# Patient Record
Sex: MALE | Race: BLACK OR AFRICAN AMERICAN | Employment: FULL TIME | URBAN - METROPOLITAN AREA
[De-identification: names, ages, dates, MRNs, and addresses within clinical notes are randomized per-mention and may not be internally consistent; named-entity substitution may affect disease eponyms.]

---

## 2018-11-28 ENCOUNTER — HOSPITAL ENCOUNTER (EMERGENCY)
Age: 33
Discharge: STILL ADMITTED OR ADMINISTRATIVE | End: 2018-11-28
Attending: EMERGENCY MEDICINE

## 2018-11-28 PROCEDURE — 75810000275 HC EMERGENCY DEPT VISIT NO LEVEL OF CARE

## 2021-07-27 ENCOUNTER — HOSPITAL ENCOUNTER (EMERGENCY)
Age: 36
Discharge: HOME | End: 2021-07-27
Attending: EMERGENCY MEDICINE

## 2021-07-27 ENCOUNTER — APPOINTMENT (OUTPATIENT)
Dept: CT IMAGING | Age: 36
End: 2021-07-27
Attending: PHYSICIAN ASSISTANT

## 2021-07-27 VITALS
HEART RATE: 79 BPM | BODY MASS INDEX: 37.19 KG/M2 | DIASTOLIC BLOOD PRESSURE: 109 MMHG | HEIGHT: 77 IN | OXYGEN SATURATION: 96 % | SYSTOLIC BLOOD PRESSURE: 154 MMHG | RESPIRATION RATE: 16 BRPM | WEIGHT: 315 LBS | TEMPERATURE: 98.7 F

## 2021-07-27 DIAGNOSIS — M54.50 CHRONIC RIGHT-SIDED LOW BACK PAIN WITHOUT SCIATICA: Primary | ICD-10-CM

## 2021-07-27 DIAGNOSIS — G89.29 CHRONIC RIGHT-SIDED LOW BACK PAIN WITHOUT SCIATICA: Primary | ICD-10-CM

## 2021-07-27 LAB
ALBUMIN SERPL-MCNC: 3.8 G/DL (ref 3.4–5)
ALBUMIN/GLOB SERPL: 1 {RATIO} (ref 0.8–1.7)
ALP SERPL-CCNC: 75 U/L (ref 45–117)
ALT SERPL-CCNC: 43 U/L (ref 16–61)
ANION GAP SERPL CALC-SCNC: 6 MMOL/L (ref 3–18)
APPEARANCE UR: CLEAR
AST SERPL-CCNC: 20 U/L (ref 10–38)
BACTERIA URNS QL MICRO: ABNORMAL /HPF
BASOPHILS # BLD: 0 K/UL (ref 0–0.1)
BASOPHILS NFR BLD: 1 % (ref 0–2)
BILIRUB SERPL-MCNC: 0.6 MG/DL (ref 0.2–1)
BILIRUB UR QL: NEGATIVE
BUN SERPL-MCNC: 17 MG/DL (ref 7–18)
BUN/CREAT SERPL: 20 (ref 12–20)
CALCIUM SERPL-MCNC: 9.2 MG/DL (ref 8.5–10.1)
CHLORIDE SERPL-SCNC: 106 MMOL/L (ref 100–111)
CO2 SERPL-SCNC: 27 MMOL/L (ref 21–32)
COLOR UR: YELLOW
CREAT SERPL-MCNC: 0.85 MG/DL (ref 0.6–1.3)
DIFFERENTIAL METHOD BLD: ABNORMAL
EOSINOPHIL # BLD: 0.1 K/UL (ref 0–0.4)
EOSINOPHIL NFR BLD: 2 % (ref 0–5)
EPITH CASTS URNS QL MICRO: ABNORMAL /LPF (ref 0–5)
ERYTHROCYTE [DISTWIDTH] IN BLOOD BY AUTOMATED COUNT: 13.9 % (ref 11.6–14.5)
GLOBULIN SER CALC-MCNC: 3.8 G/DL (ref 2–4)
GLUCOSE SERPL-MCNC: 108 MG/DL (ref 74–99)
GLUCOSE UR STRIP.AUTO-MCNC: NEGATIVE MG/DL
HCT VFR BLD AUTO: 50.5 % (ref 36–48)
HGB BLD-MCNC: 16.4 G/DL (ref 13–16)
HGB UR QL STRIP: ABNORMAL
KETONES UR QL STRIP.AUTO: NEGATIVE MG/DL
LEUKOCYTE ESTERASE UR QL STRIP.AUTO: NEGATIVE
LYMPHOCYTES # BLD: 1.9 K/UL (ref 0.9–3.6)
LYMPHOCYTES NFR BLD: 35 % (ref 21–52)
MCH RBC QN AUTO: 28.1 PG (ref 24–34)
MCHC RBC AUTO-ENTMCNC: 32.5 G/DL (ref 31–37)
MCV RBC AUTO: 86.6 FL (ref 74–97)
MONOCYTES # BLD: 0.4 K/UL (ref 0.05–1.2)
MONOCYTES NFR BLD: 7 % (ref 3–10)
NEUTS SEG # BLD: 3 K/UL (ref 1.8–8)
NEUTS SEG NFR BLD: 55 % (ref 40–73)
NITRITE UR QL STRIP.AUTO: NEGATIVE
PH UR STRIP: 5 [PH] (ref 5–8)
PLATELET # BLD AUTO: 156 K/UL (ref 135–420)
PMV BLD AUTO: 11.5 FL (ref 9.2–11.8)
POTASSIUM SERPL-SCNC: 4.4 MMOL/L (ref 3.5–5.5)
PROT SERPL-MCNC: 7.6 G/DL (ref 6.4–8.2)
PROT UR STRIP-MCNC: NEGATIVE MG/DL
RBC # BLD AUTO: 5.83 M/UL (ref 4.35–5.65)
RBC #/AREA URNS HPF: ABNORMAL /HPF (ref 0–5)
SODIUM SERPL-SCNC: 139 MMOL/L (ref 136–145)
SP GR UR REFRACTOMETRY: 1.02 (ref 1–1.03)
UROBILINOGEN UR QL STRIP.AUTO: 0.2 EU/DL (ref 0.2–1)
WBC # BLD AUTO: 5.4 K/UL (ref 4.6–13.2)
WBC URNS QL MICRO: ABNORMAL /HPF (ref 0–4)

## 2021-07-27 PROCEDURE — 74176 CT ABD & PELVIS W/O CONTRAST: CPT

## 2021-07-27 PROCEDURE — 81001 URINALYSIS AUTO W/SCOPE: CPT

## 2021-07-27 PROCEDURE — 99282 EMERGENCY DEPT VISIT SF MDM: CPT

## 2021-07-27 PROCEDURE — 80053 COMPREHEN METABOLIC PANEL: CPT

## 2021-07-27 PROCEDURE — 85025 COMPLETE CBC W/AUTO DIFF WBC: CPT

## 2021-07-27 RX ORDER — KETOROLAC TROMETHAMINE 10 MG/1
10 TABLET, FILM COATED ORAL
Qty: 10 TABLET | Refills: 0 | Status: SHIPPED | OUTPATIENT
Start: 2021-07-27

## 2021-07-27 NOTE — ED TRIAGE NOTES
Patient comes in complaining of right lower back pain. Patient states that he has been dealing with this for the last minutes. Patient states \"I hope this is not like the kidney failure that I had before. \" Patient states that it feels the same. Patient states that he tried to schedule an appt with his doctor but she no longer works in [place]. Registered Nurse

## 2021-07-27 NOTE — ED PROVIDER NOTES
[facility] EMERGENCY DEPT    Date: 7/27/2021  Patient Name: Ernestine Alfonso IV    History of Presenting Illness     Chief Complaint   Patient presents with    Flank Pain     39 y.o. male presents the ED complaining of right lower back pain for the past several months. Patient notes having a mild aching to his right SI joint region, which is worse when getting up in the morning. He has a history of kidney failure while in college, is worried that it could be that today. Patient states that he called his doctor to set up an appointment, was told that it would be quite some time, so he was directed to come here. He denies any urinary changes, numbness or weakness in his legs, bowel or bladder function loss, abdominal pain, saddle paresthesias, radiation of pain, other symptoms. Patient denies any other associated signs or symptoms. Patient denies any other complaints. Nursing notes regarding the HPI and triage nursing notes were reviewed. Prior medical records were reviewed. Past History     Past Medical History:  None     Past Surgical History:  No past surgical history on file. Family History:  No family history on file. Social History:  Social History     Tobacco Use    Smoking status: Not on file   Substance Use Topics    Alcohol use: Not on file    Drug use: Not on file       Allergies:  No Known Allergies    Patient's primary care provider (as noted in EPIC):  None    Review of Systems   Constitutional:  Denies malaise, fever, chills. Cardiac:  Denies chest pain or palpitations. Respiratory:  Denies cough, wheezing, difficulty breathing, shortness of breath. GI/ABD:  Denies injury, pain, distention, nausea, vomiting, diarrhea. :  Denies injury, pain, dysuria or urgency. Back: + right lower back pain. Pelvis:  Denies injury or pain. Extremity/MS:  Denies injury or pain.    Neuro:  Denies headache, LOC, dizziness, neurologic symptoms/deficits/paresthesias. Skin: Denies injury, rash, itching or skin changes. All other systems negative as reviewed. Visit Vitals  BP (!) 154/109   Pulse 79   Temp 98.7 °F (37.1 °C)   Resp 16   Ht 6' 4.5\" (1.943 m)   Wt 147.4 kg (325 lb)   SpO2 96%   BMI 39.04 kg/m²       PHYSICAL EXAM:  CONSTITUTIONAL:  Alert, in no apparent distress;  well developed;  well nourished. HEAD:  Normocephalic, atraumatic. EYES:  EOMI. Non-icteric sclera. Normal conjunctiva. ENTM:  Nose:  no rhinorrhea. Throat:  no erythema or exudate, mucous membranes moist.  NECK:  Supple  RESPIRATORY:  Chest clear, equal breath sounds, good air movement. CARDIOVASCULAR:  Regular rate and rhythm. No murmurs, rubs, or gallops. GI:  Normal bowel sounds, abdomen soft and non-tender. No rebound or guarding. BACK:  Lower right paralumbar/SI joint region with reproducible tenderness to palpation. No midline vertebral bony point tenderness or step-off. Normal adriano-anal sensation. Normal bilateral straight leg raise. UPPER EXT:  Normal inspection. LOWER EXT:  No edema, no calf tenderness. Distal pulses intact. NEURO:  Moves all four extremities, and grossly normal motor exam.  SKIN:  No rashes;  Normal for age. PSYCH:  Alert and normal affect. DIFFERENTIAL DIAGNOSES/ MEDICAL DECISION MAKING:  Low back pain from myofascial strain/ sprain, muscle spasm, vertebral disc problem, neuropathy to include sciatica, abscess/infection, referred retroperitoneal pain from pyelonephritis or ureterolithiasis, other etiologies versus a combination of the above (example: myofascial strain/ sprain as well as muscle spasm).     Recent Results (from the past 12 hour(s))   URINALYSIS W/ RFLX MICROSCOPIC    Collection Time: 07/27/21 11:20 AM   Result Value Ref Range    Color YELLOW      Appearance CLEAR      Specific gravity 1.025 1.005 - 1.030      pH (UA) 5.0 5.0 - 8.0      Protein Negative NEG mg/dL    Glucose Negative NEG mg/dL    Ketone Negative NEG mg/dL    Bilirubin Negative NEG      Blood TRACE (A) NEG      Urobilinogen 0.2 0.2 - 1.0 EU/dL    Nitrites Negative NEG      Leukocyte Esterase Negative NEG     METABOLIC PANEL, COMPREHENSIVE    Collection Time: 07/27/21 11:20 AM   Result Value Ref Range    Sodium 139 136 - 145 mmol/L    Potassium 4.4 3.5 - 5.5 mmol/L    Chloride 106 100 - 111 mmol/L    CO2 27 21 - 32 mmol/L    Anion gap 6 3.0 - 18 mmol/L    Glucose 108 (H) 74 - 99 mg/dL    BUN 17 7.0 - 18 MG/DL    Creatinine 0.85 0.6 - 1.3 MG/DL    BUN/Creatinine ratio 20 12 - 20      GFR est AA >60 >60 ml/min/1.73m2    GFR est non-AA >60 >60 ml/min/1.73m2    Calcium 9.2 8.5 - 10.1 MG/DL    Bilirubin, total 0.6 0.2 - 1.0 MG/DL    ALT (SGPT) 43 16 - 61 U/L    AST (SGOT) 20 10 - 38 U/L    Alk. phosphatase 75 45 - 117 U/L    Protein, total 7.6 6.4 - 8.2 g/dL    Albumin 3.8 3.4 - 5.0 g/dL    Globulin 3.8 2.0 - 4.0 g/dL    A-G Ratio 1.0 0.8 - 1.7     CBC WITH AUTOMATED DIFF    Collection Time: 07/27/21 11:20 AM   Result Value Ref Range    WBC 5.4 4.6 - 13.2 K/uL    RBC 5.83 (H) 4.35 - 5.65 M/uL    HGB 16.4 (H) 13.0 - 16.0 g/dL    HCT 50.5 (H) 36.0 - 48.0 %    MCV 86.6 74.0 - 97.0 FL    MCH 28.1 24.0 - 34.0 PG    MCHC 32.5 31.0 - 37.0 g/dL    RDW 13.9 11.6 - 14.5 %    PLATELET 229 629 - 105 K/uL    MPV 11.5 9.2 - 11.8 FL    NEUTROPHILS 55 40 - 73 %    LYMPHOCYTES 35 21 - 52 %    MONOCYTES 7 3 - 10 %    EOSINOPHILS 2 0 - 5 %    BASOPHILS 1 0 - 2 %    ABS. NEUTROPHILS 3.0 1.8 - 8.0 K/UL    ABS. LYMPHOCYTES 1.9 0.9 - 3.6 K/UL    ABS. MONOCYTES 0.4 0.05 - 1.2 K/UL    ABS. EOSINOPHILS 0.1 0.0 - 0.4 K/UL    ABS.  BASOPHILS 0.0 0.0 - 0.1 K/UL    DF AUTOMATED     URINE MICROSCOPIC ONLY    Collection Time: 07/27/21 11:20 AM   Result Value Ref Range    WBC 1 to 2 0 - 4 /hpf    RBC 0 to 1 0 - 5 /hpf    Epithelial cells FEW 0 - 5 /lpf    Bacteria FEW (A) NEG /hpf      CT ABD PELV WO CONT    Result Date: 7/27/2021  CT ABDOMEN AND PELVIS WITHOUT ENHANCEMENT INDICATION: Right flank pain, history of kidney failure. TECHNIQUE: Axial images obtained of the abdomen and pelvis without intravenous contrast. Coronal and sagittal reformatted images of the abdomen and pelvis were obtained. All CT scans at this facility are performed using dose optimization technique as appropriate to a performed exam, to include automated exposure control, adjustment of the mA and/or kV according to patient size (including appropriate matching first site-specific examinations), or use of iterative reconstruction technique. COMPARISON: None. Lack of intravenous contrast renders this study suboptimal for evaluating the solid abdominal organs, vasculature and bowel. ABDOMEN FINDINGS: Liver: Patchy mild hepatic steatosis. Spleen: Unremarkable. Pancreas: Unremarkable. Biliary: Unremarkable. Bowel: Unremarkable. Normal appendix. Peritoneum/ Retroperitoneum: 1.1 cm nodule in the left anterior abdomen is favored to represent a splenule. Lymph Nodes: Unremarkable. Adrenal Glands: Unremarkable. Kidneys: There are a few cystic appearing lesions in the left upper pole kidney measuring up to 2.2 cm laterally. Subcentimeter hypodensity in the cortex of the right lower pole, too small to evaluate but statistically most likely benign. No hydronephrosis or nephrolithiasis. Vessels: Unremarkable for age. PELVIS FINDINGS: Bladder/ Pelvic Organs: Unremarkable. Lung Base: Unremarkable. Bones/Soft tissues: Mild gynecomastia. Disc bulge at L5-S1 with Schmorl's node. Degenerative changes L5-S1 without other acute findings to explain flank pain. Patchy hepatic steatosis. IMPRESSION AND MEDICAL DECISION MAKING:  There is no signs of sciatica. No perianal decreased sensation nor bowel/bladder incontinence to suggest a neurological emergency. The patient does not have fever, no other signs of infection to suggest an epidural or other back abscess that would require emergent intervention. No stone or infection. Likely MS. Rx for toradol, pt to f/u with ortho. Diagnosis:   1. Chronic right-sided low back pain without sciatica      Disposition: Discharge    Follow-up Information     Follow up With Specialties Details Why [address]  In 3 days  [address]    [facility] EMERGENCY DEPT Emergency Medicine  If symptoms worsen [address] [facility] [address]  [phone]          Patient's Medications   Start Taking    KETOROLAC (TORADOL) 10 MG TABLET    Take 1 Tablet by mouth every six (6) hours as needed for Pain for up to 10 doses.    Continue Taking    No medications on file   These Medications have changed    No medications on file   Stop Taking    No medications on file     DOTTIE Burroughs

## 2023-06-02 PROBLEM — M51.26 HNP (HERNIATED NUCLEUS PULPOSUS), LUMBAR: Status: ACTIVE | Noted: 2023-06-02

## 2023-06-29 ENCOUNTER — APPOINTMENT (OUTPATIENT)
Facility: HOSPITAL | Age: 38
End: 2023-06-29
Payer: COMMERCIAL

## 2023-06-29 ENCOUNTER — HOSPITAL ENCOUNTER (EMERGENCY)
Facility: HOSPITAL | Age: 38
Discharge: STILL ADMITTED OR ADMINISTRATIVE | End: 2023-06-30
Attending: EMERGENCY MEDICINE
Payer: COMMERCIAL

## 2023-06-29 DIAGNOSIS — M54.50 ACUTE RIGHT-SIDED LOW BACK PAIN, UNSPECIFIED WHETHER SCIATICA PRESENT: Primary | ICD-10-CM

## 2023-06-29 DIAGNOSIS — E87.20 LACTIC ACIDOSIS: ICD-10-CM

## 2023-06-29 DIAGNOSIS — G06.1 ABSCESS IN EPIDURAL SPACE OF LUMBAR SPINE: ICD-10-CM

## 2023-06-29 LAB
ALBUMIN SERPL-MCNC: 3.8 G/DL (ref 3.4–5)
ALBUMIN/GLOB SERPL: 1 (ref 0.8–1.7)
ALP SERPL-CCNC: 78 U/L (ref 45–117)
ALT SERPL-CCNC: 42 U/L (ref 16–61)
ANION GAP SERPL CALC-SCNC: 8 MMOL/L (ref 3–18)
AST SERPL-CCNC: 14 U/L (ref 10–38)
BASOPHILS # BLD: 0 K/UL (ref 0–0.1)
BASOPHILS NFR BLD: 0 % (ref 0–2)
BILIRUB SERPL-MCNC: 0.6 MG/DL (ref 0.2–1)
BUN SERPL-MCNC: 16 MG/DL (ref 7–18)
BUN/CREAT SERPL: 17 (ref 12–20)
CALCIUM SERPL-MCNC: 9.5 MG/DL (ref 8.5–10.1)
CHLORIDE SERPL-SCNC: 101 MMOL/L (ref 100–111)
CO2 SERPL-SCNC: 28 MMOL/L (ref 21–32)
CREAT SERPL-MCNC: 0.94 MG/DL (ref 0.6–1.3)
DIFFERENTIAL METHOD BLD: ABNORMAL
EOSINOPHIL # BLD: 0 K/UL (ref 0–0.4)
EOSINOPHIL NFR BLD: 0 % (ref 0–5)
ERYTHROCYTE [DISTWIDTH] IN BLOOD BY AUTOMATED COUNT: 13.3 % (ref 11.6–14.5)
GLOBULIN SER CALC-MCNC: 3.7 G/DL (ref 2–4)
GLUCOSE SERPL-MCNC: 98 MG/DL (ref 74–99)
HCT VFR BLD AUTO: 48 % (ref 36–48)
HGB BLD-MCNC: 15.3 G/DL (ref 13–16)
IMM GRANULOCYTES # BLD AUTO: 0.1 K/UL (ref 0–0.04)
IMM GRANULOCYTES NFR BLD AUTO: 1 % (ref 0–0.5)
LACTATE SERPL-SCNC: 1.7 MMOL/L (ref 0.4–2)
LACTATE SERPL-SCNC: 2.8 MMOL/L (ref 0.4–2)
LYMPHOCYTES # BLD: 2.6 K/UL (ref 0.9–3.6)
LYMPHOCYTES NFR BLD: 22 % (ref 21–52)
MCH RBC QN AUTO: 28.2 PG (ref 24–34)
MCHC RBC AUTO-ENTMCNC: 31.9 G/DL (ref 31–37)
MCV RBC AUTO: 88.4 FL (ref 78–100)
MONOCYTES # BLD: 1 K/UL (ref 0.05–1.2)
MONOCYTES NFR BLD: 8 % (ref 3–10)
NEUTS SEG # BLD: 8.2 K/UL (ref 1.8–8)
NEUTS SEG NFR BLD: 68 % (ref 40–73)
NRBC # BLD: 0 K/UL (ref 0–0.01)
NRBC BLD-RTO: 0 PER 100 WBC
PLATELET # BLD AUTO: 219 K/UL (ref 135–420)
PMV BLD AUTO: 10.7 FL (ref 9.2–11.8)
POTASSIUM SERPL-SCNC: 4.2 MMOL/L (ref 3.5–5.5)
PROT SERPL-MCNC: 7.5 G/DL (ref 6.4–8.2)
RBC # BLD AUTO: 5.43 M/UL (ref 4.35–5.65)
SODIUM SERPL-SCNC: 137 MMOL/L (ref 136–145)
TROPONIN I SERPL HS-MCNC: 6 NG/L (ref 0–78)
WBC # BLD AUTO: 11.9 K/UL (ref 4.6–13.2)

## 2023-06-29 PROCEDURE — 87040 BLOOD CULTURE FOR BACTERIA: CPT

## 2023-06-29 PROCEDURE — 6360000002 HC RX W HCPCS: Performed by: PHYSICIAN ASSISTANT

## 2023-06-29 PROCEDURE — 80053 COMPREHEN METABOLIC PANEL: CPT

## 2023-06-29 PROCEDURE — 6360000002 HC RX W HCPCS: Performed by: EMERGENCY MEDICINE

## 2023-06-29 PROCEDURE — 84145 PROCALCITONIN (PCT): CPT

## 2023-06-29 PROCEDURE — 85025 COMPLETE CBC W/AUTO DIFF WBC: CPT

## 2023-06-29 PROCEDURE — 87150 DNA/RNA AMPLIFIED PROBE: CPT

## 2023-06-29 PROCEDURE — 2580000003 HC RX 258: Performed by: EMERGENCY MEDICINE

## 2023-06-29 PROCEDURE — 71045 X-RAY EXAM CHEST 1 VIEW: CPT

## 2023-06-29 PROCEDURE — 87186 SC STD MICRODIL/AGAR DIL: CPT

## 2023-06-29 PROCEDURE — 84484 ASSAY OF TROPONIN QUANT: CPT

## 2023-06-29 PROCEDURE — 83605 ASSAY OF LACTIC ACID: CPT

## 2023-06-29 PROCEDURE — 87147 CULTURE TYPE IMMUNOLOGIC: CPT

## 2023-06-29 PROCEDURE — 87077 CULTURE AEROBIC IDENTIFY: CPT

## 2023-06-29 RX ORDER — MORPHINE SULFATE 4 MG/ML
4 INJECTION, SOLUTION INTRAMUSCULAR; INTRAVENOUS
Status: COMPLETED | OUTPATIENT
Start: 2023-06-29 | End: 2023-06-29

## 2023-06-29 RX ORDER — METHOCARBAMOL 100 MG/ML
1000 INJECTION, SOLUTION INTRAMUSCULAR; INTRAVENOUS ONCE
Status: COMPLETED | OUTPATIENT
Start: 2023-06-30 | End: 2023-06-30

## 2023-06-29 RX ORDER — SODIUM CHLORIDE, SODIUM LACTATE, POTASSIUM CHLORIDE, AND CALCIUM CHLORIDE .6; .31; .03; .02 G/100ML; G/100ML; G/100ML; G/100ML
30 INJECTION, SOLUTION INTRAVENOUS ONCE
Status: COMPLETED | OUTPATIENT
Start: 2023-06-29 | End: 2023-06-30

## 2023-06-29 RX ADMIN — PIPERACILLIN AND TAZOBACTAM 4500 MG: 4; .5 INJECTION, POWDER, LYOPHILIZED, FOR SOLUTION INTRAVENOUS at 23:23

## 2023-06-29 RX ADMIN — SODIUM CHLORIDE, POTASSIUM CHLORIDE, SODIUM LACTATE AND CALCIUM CHLORIDE 2604 ML: 600; 310; 30; 20 INJECTION, SOLUTION INTRAVENOUS at 23:31

## 2023-06-29 RX ADMIN — MORPHINE SULFATE 4 MG: 4 INJECTION, SOLUTION INTRAMUSCULAR; INTRAVENOUS at 21:55

## 2023-06-29 RX ADMIN — HYDROMORPHONE HYDROCHLORIDE 0.5 MG: 1 INJECTION, SOLUTION INTRAMUSCULAR; INTRAVENOUS; SUBCUTANEOUS at 23:25

## 2023-06-30 ENCOUNTER — APPOINTMENT (OUTPATIENT)
Facility: HOSPITAL | Age: 38
End: 2023-06-30
Payer: COMMERCIAL

## 2023-06-30 VITALS
OXYGEN SATURATION: 99 % | HEART RATE: 74 BPM | WEIGHT: 315 LBS | TEMPERATURE: 98 F | DIASTOLIC BLOOD PRESSURE: 80 MMHG | RESPIRATION RATE: 24 BRPM | HEIGHT: 76 IN | SYSTOLIC BLOOD PRESSURE: 130 MMHG | BODY MASS INDEX: 38.36 KG/M2

## 2023-06-30 PROBLEM — T81.89XA LUMBAR SURGICAL WOUND FLUID COLLECTION, INITIAL ENCOUNTER: Status: ACTIVE | Noted: 2023-06-30

## 2023-06-30 PROBLEM — M54.59 INTRACTABLE LOW BACK PAIN: Status: ACTIVE | Noted: 2023-06-30

## 2023-06-30 LAB
ACCESSION NUMBER, LLC1M: ABNORMAL
ACINETOBACTER CALCOAC BAUMANNII COMPLEX BY PCR: NOT DETECTED
APPEARANCE UR: CLEAR
B FRAGILIS DNA BLD POS QL NAA+NON-PROBE: NOT DETECTED
BILIRUB UR QL: NEGATIVE
BIOFIRE TEST COMMENT: ABNORMAL
C ALBICANS DNA BLD POS QL NAA+NON-PROBE: NOT DETECTED
C AURIS DNA BLD POS QL NAA+NON-PROBE: NOT DETECTED
C GATTII+NEOFOR DNA BLD POS QL NAA+N-PRB: NOT DETECTED
C GLABRATA DNA BLD POS QL NAA+NON-PROBE: NOT DETECTED
C KRUSEI DNA BLD POS QL NAA+NON-PROBE: NOT DETECTED
C PARAP DNA BLD POS QL NAA+NON-PROBE: NOT DETECTED
C TROPICLS DNA BLD POS QL NAA+NON-PROBE: NOT DETECTED
COLOR UR: YELLOW
E CLOAC COMP DNA BLD POS NAA+NON-PROBE: NOT DETECTED
E COLI DNA BLD POS QL NAA+NON-PROBE: NOT DETECTED
E FAECALIS DNA BLD POS QL NAA+NON-PROBE: NOT DETECTED
E FAECIUM DNA BLD POS QL NAA+NON-PROBE: NOT DETECTED
ENTEROBACTERALES DNA BLD POS NAA+N-PRB: NOT DETECTED
GLUCOSE UR STRIP.AUTO-MCNC: NEGATIVE MG/DL
GP B STREP DNA BLD POS QL NAA+NON-PROBE: NOT DETECTED
HAEM INFLU DNA BLD POS QL NAA+NON-PROBE: NOT DETECTED
HGB UR QL STRIP: NEGATIVE
K OXYTOCA DNA BLD POS QL NAA+NON-PROBE: NOT DETECTED
KETONES UR QL STRIP.AUTO: NEGATIVE MG/DL
KLEBSIELLA SP DNA BLD POS QL NAA+NON-PRB: NOT DETECTED
KLEBSIELLA SP DNA BLD POS QL NAA+NON-PRB: NOT DETECTED
L MONOCYTOG DNA BLD POS QL NAA+NON-PROBE: NOT DETECTED
LACTATE SERPL-SCNC: 1.8 MMOL/L (ref 0.4–2)
LEUKOCYTE ESTERASE UR QL STRIP.AUTO: NEGATIVE
MECA+MECC+MREJ ISLT/SPM QL: NOT DETECTED
N MEN DNA BLD POS QL NAA+NON-PROBE: NOT DETECTED
NITRITE UR QL STRIP.AUTO: NEGATIVE
P AERUGINOSA DNA BLD POS NAA+NON-PROBE: NOT DETECTED
PH UR STRIP: 5.5 (ref 5–8)
PROCALCITONIN SERPL-MCNC: <0.05 NG/ML
PROT UR STRIP-MCNC: NEGATIVE MG/DL
PROTEUS SP DNA BLD POS QL NAA+NON-PROBE: NOT DETECTED
RESISTANT GENE TARGETS: ABNORMAL
S AUREUS DNA BLD POS QL NAA+NON-PROBE: DETECTED
S AUREUS+CONS DNA BLD POS NAA+NON-PROBE: DETECTED
S EPIDERMIDIS DNA BLD POS QL NAA+NON-PRB: NOT DETECTED
S LUGDUNENSIS DNA BLD POS QL NAA+NON-PRB: NOT DETECTED
S MALTOPHILIA DNA BLD POS QL NAA+NON-PRB: NOT DETECTED
S MARCESCENS DNA BLD POS NAA+NON-PROBE: NOT DETECTED
S PNEUM DNA BLD POS QL NAA+NON-PROBE: NOT DETECTED
S PYO DNA BLD POS QL NAA+NON-PROBE: NOT DETECTED
SALMONELLA DNA BLD POS QL NAA+NON-PROBE: NOT DETECTED
SP GR UR REFRACTOMETRY: 1.02 (ref 1–1.03)
STREPTOCOCCUS DNA BLD POS NAA+NON-PROBE: NOT DETECTED
UROBILINOGEN UR QL STRIP.AUTO: 0.2 EU/DL (ref 0.2–1)
VANCOMYCIN SERPL-MCNC: 7.5 UG/ML (ref 5–40)

## 2023-06-30 PROCEDURE — 2580000003 HC RX 258: Performed by: EMERGENCY MEDICINE

## 2023-06-30 PROCEDURE — A9577 INJ MULTIHANCE: HCPCS | Performed by: EMERGENCY MEDICINE

## 2023-06-30 PROCEDURE — 80202 ASSAY OF VANCOMYCIN: CPT

## 2023-06-30 PROCEDURE — 83605 ASSAY OF LACTIC ACID: CPT

## 2023-06-30 PROCEDURE — 81003 URINALYSIS AUTO W/O SCOPE: CPT

## 2023-06-30 PROCEDURE — 6360000002 HC RX W HCPCS: Performed by: EMERGENCY MEDICINE

## 2023-06-30 PROCEDURE — 72158 MRI LUMBAR SPINE W/O & W/DYE: CPT

## 2023-06-30 PROCEDURE — 6360000004 HC RX CONTRAST MEDICATION: Performed by: EMERGENCY MEDICINE

## 2023-06-30 RX ORDER — HYDROMORPHONE HYDROCHLORIDE 1 MG/ML
1 INJECTION, SOLUTION INTRAMUSCULAR; INTRAVENOUS; SUBCUTANEOUS
Status: COMPLETED | OUTPATIENT
Start: 2023-06-30 | End: 2023-06-30

## 2023-06-30 RX ORDER — HYDROMORPHONE HYDROCHLORIDE 1 MG/ML
1 INJECTION, SOLUTION INTRAMUSCULAR; INTRAVENOUS; SUBCUTANEOUS ONCE
Status: COMPLETED | OUTPATIENT
Start: 2023-06-30 | End: 2023-06-30

## 2023-06-30 RX ADMIN — HYDROMORPHONE HYDROCHLORIDE 0.5 MG: 1 INJECTION, SOLUTION INTRAMUSCULAR; INTRAVENOUS; SUBCUTANEOUS at 03:33

## 2023-06-30 RX ADMIN — HYDROMORPHONE HYDROCHLORIDE 1 MG: 1 INJECTION, SOLUTION INTRAMUSCULAR; INTRAVENOUS; SUBCUTANEOUS at 15:59

## 2023-06-30 RX ADMIN — Medication 1750 MG: at 12:13

## 2023-06-30 RX ADMIN — HYDROMORPHONE HYDROCHLORIDE 1 MG: 1 INJECTION, SOLUTION INTRAMUSCULAR; INTRAVENOUS; SUBCUTANEOUS at 01:26

## 2023-06-30 RX ADMIN — GADOBENATE DIMEGLUMINE 20 ML: 529 INJECTION, SOLUTION INTRAVENOUS at 04:34

## 2023-06-30 RX ADMIN — VANCOMYCIN HYDROCHLORIDE 2000 MG: 10 INJECTION, POWDER, LYOPHILIZED, FOR SOLUTION INTRAVENOUS at 00:42

## 2023-06-30 RX ADMIN — HYDROMORPHONE HYDROCHLORIDE 1 MG: 1 INJECTION, SOLUTION INTRAMUSCULAR; INTRAVENOUS; SUBCUTANEOUS at 09:50

## 2023-06-30 RX ADMIN — METHOCARBAMOL 1000 MG: 100 INJECTION INTRAMUSCULAR; INTRAVENOUS at 00:42

## 2023-06-30 ASSESSMENT — PAIN SCALES - GENERAL: PAINLEVEL_OUTOF10: 10

## 2023-06-30 ASSESSMENT — PAIN DESCRIPTION - LOCATION: LOCATION: BACK;GROIN

## 2023-07-02 LAB
BACTERIA SPEC CULT: ABNORMAL
GRAM STN SPEC: ABNORMAL
GRAM STN SPEC: ABNORMAL
SERVICE CMNT-IMP: ABNORMAL

## 2023-07-06 ENCOUNTER — HOME HEALTH ADMISSION (OUTPATIENT)
Age: 38
End: 2023-07-06
Payer: COMMERCIAL

## 2023-07-07 ENCOUNTER — HOME CARE VISIT (OUTPATIENT)
Age: 38
End: 2023-07-07
Payer: COMMERCIAL

## 2023-07-07 ENCOUNTER — HOME CARE VISIT (OUTPATIENT)
Age: 38
End: 2023-07-07

## 2023-07-07 PROCEDURE — G0299 HHS/HOSPICE OF RN EA 15 MIN: HCPCS

## 2023-07-07 ASSESSMENT — ENCOUNTER SYMPTOMS: HEMOPTYSIS: 0

## 2023-07-09 VITALS
DIASTOLIC BLOOD PRESSURE: 78 MMHG | TEMPERATURE: 97 F | SYSTOLIC BLOOD PRESSURE: 159 MMHG | OXYGEN SATURATION: 97 % | HEART RATE: 68 BPM | RESPIRATION RATE: 18 BRPM

## 2023-07-09 ASSESSMENT — ENCOUNTER SYMPTOMS
PAIN LOCATION - PAIN QUALITY: ACHE, SHARP
DYSPNEA ACTIVITY LEVEL: AFTER AMBULATING 10 - 20 FT

## 2023-07-10 ENCOUNTER — HOME CARE VISIT (OUTPATIENT)
Age: 38
End: 2023-07-10
Payer: COMMERCIAL

## 2023-07-10 ENCOUNTER — HOSPITAL ENCOUNTER (OUTPATIENT)
Facility: HOSPITAL | Age: 38
Setting detail: SPECIMEN
Discharge: HOME | End: 2023-07-13
Payer: COMMERCIAL

## 2023-07-10 LAB
ANION GAP SERPL CALC-SCNC: 9 MMOL/L (ref 3–18)
BASOPHILS # BLD: 0 K/UL (ref 0–0.1)
BASOPHILS NFR BLD: 0 % (ref 0–2)
BUN SERPL-MCNC: 17 MG/DL (ref 7–18)
BUN/CREAT SERPL: 24 (ref 12–20)
CALCIUM SERPL-MCNC: 8.8 MG/DL (ref 8.5–10.1)
CHLORIDE SERPL-SCNC: 102 MMOL/L (ref 100–111)
CO2 SERPL-SCNC: 27 MMOL/L (ref 21–32)
CREAT SERPL-MCNC: 0.72 MG/DL (ref 0.6–1.3)
CRP SERPL-MCNC: 6 MG/DL (ref 0–0.3)
DIFFERENTIAL METHOD BLD: ABNORMAL
EOSINOPHIL # BLD: 0.1 K/UL (ref 0–0.4)
EOSINOPHIL NFR BLD: 1 % (ref 0–5)
ERYTHROCYTE [DISTWIDTH] IN BLOOD BY AUTOMATED COUNT: 13.4 % (ref 11.6–14.5)
ERYTHROCYTE [SEDIMENTATION RATE] IN BLOOD: 38 MM/HR (ref 0–15)
GLUCOSE SERPL-MCNC: 96 MG/DL (ref 74–99)
HCT VFR BLD AUTO: 43.9 % (ref 36–48)
HGB BLD-MCNC: 13.7 G/DL (ref 13–16)
IMM GRANULOCYTES # BLD AUTO: 0.1 K/UL (ref 0–0.04)
IMM GRANULOCYTES NFR BLD AUTO: 1 % (ref 0–0.5)
LYMPHOCYTES # BLD: 2.2 K/UL (ref 0.9–3.6)
LYMPHOCYTES NFR BLD: 25 % (ref 21–52)
MCH RBC QN AUTO: 27.6 PG (ref 24–34)
MCHC RBC AUTO-ENTMCNC: 31.2 G/DL (ref 31–37)
MCV RBC AUTO: 88.3 FL (ref 78–100)
MONOCYTES # BLD: 0.8 K/UL (ref 0.05–1.2)
MONOCYTES NFR BLD: 9 % (ref 3–10)
NEUTS SEG # BLD: 5.4 K/UL (ref 1.8–8)
NEUTS SEG NFR BLD: 64 % (ref 40–73)
NRBC # BLD: 0 K/UL (ref 0–0.01)
NRBC BLD-RTO: 0 PER 100 WBC
PLATELET # BLD AUTO: 196 K/UL (ref 135–420)
PMV BLD AUTO: 10.8 FL (ref 9.2–11.8)
POTASSIUM SERPL-SCNC: 4 MMOL/L (ref 3.5–5.5)
RBC # BLD AUTO: 4.97 M/UL (ref 4.35–5.65)
SODIUM SERPL-SCNC: 138 MMOL/L (ref 136–145)
WBC # BLD AUTO: 8.5 K/UL (ref 4.6–13.2)

## 2023-07-10 PROCEDURE — 86140 C-REACTIVE PROTEIN: CPT

## 2023-07-10 PROCEDURE — 80048 BASIC METABOLIC PNL TOTAL CA: CPT

## 2023-07-10 PROCEDURE — G0299 HHS/HOSPICE OF RN EA 15 MIN: HCPCS

## 2023-07-10 PROCEDURE — 85025 COMPLETE CBC W/AUTO DIFF WBC: CPT

## 2023-07-10 PROCEDURE — 85652 RBC SED RATE AUTOMATED: CPT

## 2023-07-11 ENCOUNTER — HOME CARE VISIT (OUTPATIENT)
Age: 38
End: 2023-07-11
Payer: COMMERCIAL

## 2023-07-12 VITALS
HEART RATE: 94 BPM | DIASTOLIC BLOOD PRESSURE: 87 MMHG | SYSTOLIC BLOOD PRESSURE: 150 MMHG | RESPIRATION RATE: 16 BRPM | TEMPERATURE: 97 F | OXYGEN SATURATION: 96 %

## 2023-07-12 ASSESSMENT — ENCOUNTER SYMPTOMS: DYSPNEA ACTIVITY LEVEL: AFTER AMBULATING 10 - 20 FT

## 2023-07-12 NOTE — HOME HEALTH
Skilled reason for visit: [address], ASSESS PICC LINE, DRAW LABS  Caregiver involvement:COOKS, CLEANS AND TAKES PATIENT TO MD APPT. Medications reviewed and all medications are available in the home this visit. The following education was provided regarding medications:  INSTRUCTED ON IMPORTANCE OF MEDICATION COMPLIANCY. .    MD notified of any discrepancies/look a-like medications/medication interactions: NA  Medications are IN HOME AND EFFECTIVE  at this time. Home health supplies by type and quantity ordered/delivered this visit include: NA    Patient education provided this visit: WOUND CARE, SEE NSG INTERVENTIONS, INSTRUCTED ON S/S OF INFECTION AT SURGICAL SITE/ BACK. INSTRUCTED ON USE OF PICC LINE AND IV THERAPY. WIFE DOES IV INFUSION. Sharps education provided: YES     Patient level of understanding of education provided:GOOD, VERBALIZED UNDERSTANDING. Skilled Care Performed this visit:SN. ASSESSMENT OF PICC LINE, LABS DRAWN AND TAKEN TO [facility]. CHANGED DSG ON LUMBAR . TEACHING ON DISEASE PROCESS. TAUGHT PATIENT ON CONSTIPATION. Patient response to procedure performed:  GOOD. Patient's Progress towards personal goals: PROGRESSING. Home exercise program: DEEP BREATHING EXERCISES TO HELP PREVENT PNEUMONIA. Continued need for the following skills: Nursing. NEXT VISIT: DSG CHANGE TO LUMBAR SURGICAL SITE. TEACHING ON DISEASE PROCESS. Patient and/or caregiver notified and agrees to changes in the Plan of Care: Yes. The following discharge planning was discussed with the pt/caregiver: PATIENT WILL BE DISCHARGED WHEN GOALS ARE MET AND PATIENT IS STABLE.

## 2023-07-13 ENCOUNTER — HOME CARE VISIT (OUTPATIENT)
Age: 38
End: 2023-07-13
Payer: COMMERCIAL

## 2023-07-13 VITALS
TEMPERATURE: 98.1 F | OXYGEN SATURATION: 99 % | DIASTOLIC BLOOD PRESSURE: 84 MMHG | SYSTOLIC BLOOD PRESSURE: 145 MMHG | RESPIRATION RATE: 16 BRPM | HEART RATE: 75 BPM

## 2023-07-13 PROCEDURE — G0151 HHCP-SERV OF PT,EA 15 MIN: HCPCS

## 2023-07-13 ASSESSMENT — ENCOUNTER SYMPTOMS: PAIN LOCATION - PAIN QUALITY: CONSTANT PAIN

## 2023-07-13 NOTE — HOME HEALTH
Skilled Reason for admission/summary of clinical condition:  admitted to the hospital on 6/30/2023 5:04 PM for wound infection. Lumbar I & D  HHPT medically necessary to address  dx and clinical findings :decreased LE strength, impaired gait, no HEP, stairs, LE swelling, bed mobility, dep in transfers, decreased endurance, decreased balance and decreased safety in order to improve functional mobility, quality of life and decrease burden of care. PMHX: HTN, intractable low back pain, recent lumbar surgery   Caregiver involvement: lives with spouse and family , who is available 24/7 and assists with meds, meals, functional mobility, transport to MD appts. Lives in 2 story house with 4 stairs to enter with handrails bedroom on 2nd floor   Medications reconciled and all medications are available in the home this visit. PLOF: amb without AD , Indep with ADLs,   ROM: at initial assessment:   BLE wfl   Strength: functionally assessed d/t increased pain   L hip flexion 3/5, L knee flexion 3/5,  L knee extension 3/5,    R hip flexion 3/5, R knee flexion 3/5,  R knee extension 3/5,    Bed mobility: supine <> sit Horacio  log roll method   Transfers: pt transferred sit to stand with cga. VC for scooting to edge of seat and placement of BUE in order for ease with anterior weight shift. Gait training: PT instructed pt in amb 50ft with RW sba. Gait deficits noted: decreased amb with wide TOSIN, decreased B heel strike, decreased B foot clearance, decreased heel-toe gait, Patient education provided this visit:  BLE strengthening HEP, transfer/gait training, fall prevention  Patient/caregiver degree of understanding: good, able to verbalize with vc, will benefit reinforcement  Home exercise program/Homework provided: 10 reps each BLE 2- 3x/daily    Supine: AP, QS, GS , heel slides   Seated: HR/TR, LAQ, hip flexion, hip add   Pt/Caregiver instructed on plan of care and are agreeable to plan of care at this time.

## 2023-07-14 ENCOUNTER — HOME CARE VISIT (OUTPATIENT)
Age: 38
End: 2023-07-14
Payer: COMMERCIAL

## 2023-07-14 PROCEDURE — G0299 HHS/HOSPICE OF RN EA 15 MIN: HCPCS

## 2023-07-15 ENCOUNTER — HOME CARE VISIT (OUTPATIENT)
Age: 38
End: 2023-07-15
Payer: COMMERCIAL

## 2023-07-17 VITALS
RESPIRATION RATE: 16 BRPM | DIASTOLIC BLOOD PRESSURE: 76 MMHG | TEMPERATURE: 97 F | OXYGEN SATURATION: 98 % | SYSTOLIC BLOOD PRESSURE: 115 MMHG | HEART RATE: 95 BPM

## 2023-07-18 ENCOUNTER — HOSPITAL ENCOUNTER (OUTPATIENT)
Facility: HOSPITAL | Age: 38
Setting detail: SPECIMEN
Discharge: HOME | End: 2023-07-21
Payer: COMMERCIAL

## 2023-07-18 ENCOUNTER — HOME CARE VISIT (OUTPATIENT)
Age: 38
End: 2023-07-18
Payer: COMMERCIAL

## 2023-07-18 LAB
ANION GAP SERPL CALC-SCNC: 5 MMOL/L (ref 3–18)
BASOPHILS # BLD: 0 K/UL (ref 0–0.1)
BASOPHILS NFR BLD: 1 % (ref 0–2)
BUN SERPL-MCNC: 11 MG/DL (ref 7–18)
BUN/CREAT SERPL: 25 (ref 12–20)
CALCIUM SERPL-MCNC: 6.6 MG/DL (ref 8.5–10.1)
CHLORIDE SERPL-SCNC: 116 MMOL/L (ref 100–111)
CO2 SERPL-SCNC: 23 MMOL/L (ref 21–32)
CREAT SERPL-MCNC: 0.44 MG/DL (ref 0.6–1.3)
CRP SERPL-MCNC: 2.7 MG/DL (ref 0–0.3)
DIFFERENTIAL METHOD BLD: ABNORMAL
EOSINOPHIL # BLD: 0.1 K/UL (ref 0–0.4)
EOSINOPHIL NFR BLD: 2 % (ref 0–5)
ERYTHROCYTE [DISTWIDTH] IN BLOOD BY AUTOMATED COUNT: 13.3 % (ref 11.6–14.5)
ERYTHROCYTE [SEDIMENTATION RATE] IN BLOOD: 8 MM/HR (ref 0–15)
GLUCOSE SERPL-MCNC: 80 MG/DL (ref 74–99)
HCT VFR BLD AUTO: 42.4 % (ref 36–48)
HGB BLD-MCNC: 13.3 G/DL (ref 13–16)
IMM GRANULOCYTES # BLD AUTO: 0.1 K/UL (ref 0–0.04)
IMM GRANULOCYTES NFR BLD AUTO: 1 % (ref 0–0.5)
LYMPHOCYTES # BLD: 1.6 K/UL (ref 0.9–3.6)
LYMPHOCYTES NFR BLD: 32 % (ref 21–52)
MCH RBC QN AUTO: 27.9 PG (ref 24–34)
MCHC RBC AUTO-ENTMCNC: 31.4 G/DL (ref 31–37)
MCV RBC AUTO: 89.1 FL (ref 78–100)
MONOCYTES # BLD: 0.4 K/UL (ref 0.05–1.2)
MONOCYTES NFR BLD: 9 % (ref 3–10)
NEUTS SEG # BLD: 2.8 K/UL (ref 1.8–8)
NEUTS SEG NFR BLD: 56 % (ref 40–73)
NRBC # BLD: 0 K/UL (ref 0–0.01)
NRBC BLD-RTO: 0 PER 100 WBC
PLATELET # BLD AUTO: 175 K/UL (ref 135–420)
PMV BLD AUTO: 11.6 FL (ref 9.2–11.8)
POTASSIUM SERPL-SCNC: 3.1 MMOL/L (ref 3.5–5.5)
RBC # BLD AUTO: 4.76 M/UL (ref 4.35–5.65)
SODIUM SERPL-SCNC: 144 MMOL/L (ref 136–145)
WBC # BLD AUTO: 5.1 K/UL (ref 4.6–13.2)

## 2023-07-18 PROCEDURE — 86140 C-REACTIVE PROTEIN: CPT

## 2023-07-18 PROCEDURE — 85652 RBC SED RATE AUTOMATED: CPT

## 2023-07-18 PROCEDURE — 36415 COLL VENOUS BLD VENIPUNCTURE: CPT

## 2023-07-18 PROCEDURE — G0299 HHS/HOSPICE OF RN EA 15 MIN: HCPCS

## 2023-07-18 PROCEDURE — 80048 BASIC METABOLIC PNL TOTAL CA: CPT

## 2023-07-18 PROCEDURE — 85025 COMPLETE CBC W/AUTO DIFF WBC: CPT

## 2023-07-18 NOTE — HOME HEALTH
Skilled reason for visit: [address], ASSESS PICC LINE, DRAW LABS  Caregiver involvement:COOKS, CLEANS AND TAKES PATIENT TO MD APPT. Medications reviewed and all medications are available in the home this visit. The following education was provided regarding medications:  INSTRUCTED ON IMPORTANCE OF MEDICATION COMPLIANCY. .    MD notified of any discrepancies/look a-like medications/medication interactions: NA  Medications are IN HOME AND EFFECTIVE  at this time. Home health supplies by type and quantity ordered/delivered this visit include: NA    Patient education provided this visit: WOUND CARE, SEE NSG INTERVENTIONS, INSTRUCTED ON S/S OF INFECTION AT SURGICAL SITE/ BACK. INSTRUCTED ON USE OF PICC LINE AND IV THERAPY. WIFE DOES IV INFUSION. Sharps education provided: YES     Patient level of understanding of education provided:GOOD, VERBALIZED UNDERSTANDING. Skilled Care Performed this visit:SN. ASSESSMENT OF PICC LINE,dsg changed. CHANGED DSG ON LUMBAR . TEACHING ON DISEASE PROCESS. TAUGHT PATIENT ON CONSTIPATION WITH USE OF OPIOIDS. Patient response to procedure performed:  GOOD. Patient's Progress towards personal goals: PROGRESSING. Home exercise program: DEEP BREATHING EXERCISES TO HELP PREVENT PNEUMONIA. Continued need for the following skills: Nursing. NEXT VISIT: DSG CHANGE TO LUMBAR SURGICAL SITE. TEACHING ON DISEASE PROCESS. Patient and/or caregiver notified and agrees to changes in the Plan of Care: Yes. The following discharge planning was discussed with the pt/caregiver: PATIENT WILL BE DISCHARGED WHEN GOALS ARE MET AND PATIENT IS STABLE.

## 2023-07-19 ENCOUNTER — HOME CARE VISIT (OUTPATIENT)
Age: 38
End: 2023-07-19
Payer: COMMERCIAL

## 2023-07-19 VITALS
DIASTOLIC BLOOD PRESSURE: 85 MMHG | HEART RATE: 76 BPM | OXYGEN SATURATION: 98 % | SYSTOLIC BLOOD PRESSURE: 150 MMHG | TEMPERATURE: 98 F

## 2023-07-19 PROCEDURE — G0157 HHC PT ASSISTANT EA 15: HCPCS

## 2023-07-19 NOTE — HOME HEALTH
SUBJECTIVE: Pt complains of 6/10 low back pain, no changes in medications, no falls reported   CAREGIVER INVOLVEMENT/ASSISTANCE NEEDED FOR: Patients spouse and children assit with ADLs as needed  OBJECTIVE: See interventions  PATIENT EDUCATION PROVIDED THIS VISIT: Pt educated in pain management and fall prevention tech    PATIENT RESPONSE TO EDUCATION: Pt verbalizes understanding of education    PATIENT RESPONSE TO TREATMENT/ASSESSMENT OF PROGRESS TOWARD GOALS: Pt complains of pain in low back, pt states pt increases at night making it difficult to sleep. Recommend pt take meds as prescirbed around the clock an increase use of ice 20 minuets every hour with barrier. Established HEP pt instructed to perform 3x day, instructed in sit to stand transfers from couch VCs to improve body mechanics and hand placement, Stair negotiation tng to exit/enter home assist and VCs for sequengimg and safety. Gait tng outside on uneven surfaces with FWW VCs to increased foot clearance and to maintain upright posture. Pt tolerated tx without increase in pain reported, c/o fatigue 6/10. Pt would benefit from PT to improve Strength, Gait and Endurance to return to PLOF. PLAN FOR NEXT VISIT: Next visit will review HEP and Education will address safety with transfers,  gait and strength  DISCHARGE PLANS: POC and Discharge plans discussed pt understands and agrees eventual DC once goals have been met or max HC benefits have been achieved.  Frequency is 2wk3, 5 visits remaining anticipated DC  8/3/2023

## 2023-07-20 VITALS
SYSTOLIC BLOOD PRESSURE: 150 MMHG | RESPIRATION RATE: 16 BRPM | DIASTOLIC BLOOD PRESSURE: 90 MMHG | TEMPERATURE: 97 F | HEART RATE: 100 BPM | OXYGEN SATURATION: 96 %

## 2023-07-21 ENCOUNTER — HOME CARE VISIT (OUTPATIENT)
Age: 38
End: 2023-07-21
Payer: COMMERCIAL

## 2023-07-21 VITALS
SYSTOLIC BLOOD PRESSURE: 119 MMHG | DIASTOLIC BLOOD PRESSURE: 64 MMHG | HEART RATE: 90 BPM | TEMPERATURE: 97.5 F | OXYGEN SATURATION: 98 %

## 2023-07-21 PROCEDURE — G0157 HHC PT ASSISTANT EA 15: HCPCS

## 2023-07-21 ASSESSMENT — ENCOUNTER SYMPTOMS: PAIN LOCATION - PAIN QUALITY: ACHING

## 2023-07-21 NOTE — HOME HEALTH
SUBJECTIVE: Pt complains of back and bilateral hip pain, 2 new medications, no falls reported  CAREGIVER INVOLVEMENT/ASSISTANCE NEEDED FOR: Patients spouse and children assit with ADLs as needed  OBJECTIVE: See interventions  PATIENT EDUCATION PROVIDED THIS VISIT: Pt educated in pain management and fall prevention   PATIENT RESPONSE TO EDUCATION: Pt able to teach back fall prevention and pain management   PATIENT RESPONSE TO TREATMENT/ASSESSMENT OF PROGRESS TOWARD GOALS: Pt complains of low back and bilatreral hip pain, unable to sleep at night due to pain, recommend pt schedule meds and take prior to going to bed, continue use of ice x 20 minutes hourly with barrier between ice pack and skin. Added mini squats and pelvic tilt to HEP. Stair negotiation requires minimal assist and use of bilateral rails, Gait tng outside on uneven surfaces requires FWW and VCs to improve stride length and gait speed. Pt tolerated tx without increase in pain reported, c/o fatigue 7/10. Pt would benefit from PT to improve Strength, Gait and Endurance to return to PLOF. PLAN FOR NEXT VISIT: Continue strength tng and gait  DISCHARGE PLANS: POC and Discharge plans discussed pt understands and agrees eventual DC once goals have been met or max HC benefits have been achieved.  Frequency is 2wk3, 5 visits remaining anticipated DC  8/3/2023

## 2023-07-21 NOTE — HOME HEALTH
Skilled reason for visit: [address], ASSESS PICC LINE, DRAW LABS  Caregiver involvement:COOKS, CLEANS AND TAKES PATIENT TO MD APPT. Medications reviewed and all medications are available in the home this visit. The following education was provided regarding medications:  INSTRUCTED ON IMPORTANCE OF MEDICATION COMPLIANCY. .    MD notified of any discrepancies/look a-like medications/medication interactions: NA  Medications are IN HOME AND EFFECTIVE  at this time. Home health supplies by type and quantity ordered/delivered this visit include: NA    Patient education provided this visit: WOUND CARE, SEE NSG INTERVENTIONS, INSTRUCTED ON S/S OF INFECTION AT SURGICAL SITE/ BACK. INSTRUCTED ON USE OF PICC LINE AND IV THERAPY. WIFE DOES IV INFUSION. Sharps education provided: YES     Patient level of understanding of education provided:GOOD, VERBALIZED UNDERSTANDING. Skilled Care Performed this visit:SN. ASSESSMENT OF PICC LINE,dsg changed. CHANGED DSG ON LUMBAR . LABS DRAWN AND TAKEN  [address]. TEACHING ON DISEASE PROCESS. TAUGHT PATIENT ON CONSTIPATION WITH USE OF OPIOIDS. Patient response to procedure performed:  GOOD. Patient's Progress towards personal goals: PROGRESSING. Home exercise program: DEEP BREATHING EXERCISES TO HELP PREVENT PNEUMONIA. Continued need for the following skills: Nursing. NEXT VISIT: DSG CHANGE TO LUMBAR SURGICAL SITE. TEACHING ON DISEASE PROCESS. Patient and/or caregiver notified and agrees to changes in the Plan of Care: Yes. The following discharge planning was discussed with the pt/caregiver: PATIENT WILL BE DISCHARGED WHEN GOALS ARE MET AND PATIENT IS STABLE.

## 2023-07-23 ASSESSMENT — ENCOUNTER SYMPTOMS: PAIN LOCATION - PAIN QUALITY: ACHE

## 2023-07-24 ENCOUNTER — HOME CARE VISIT (OUTPATIENT)
Age: 38
End: 2023-07-24
Payer: COMMERCIAL

## 2023-07-24 PROCEDURE — G0157 HHC PT ASSISTANT EA 15: HCPCS

## 2023-07-25 NOTE — HOME HEALTH
SUBJECTIVE: Pt complains of back pain that ranges from 2-10/10 pain in worse at night and patient is unable to sleep, pt states LEs feel very weak   CAREGIVER INVOLVEMENT/ASSISTANCE NEEDED FOR: Patients spouse and children assit with ADLs as needed  OBJECTIVE: See interventions  PATIENT EDUCATION PROVIDED THIS VISIT: Pt educated in pain management recommended alternating pain meds rather that taking together, instructed to ice x 20 minutes every hour with barrier between ice pack and skin, pt instructed to use AD at all times   PATIENT RESPONSE TO EDUCATION: Pt verbalizes understanding of education   PATIENT RESPONSE TO TREATMENT/ASSESSMENT OF PROGRESS TOWARD GOALS: Spoke with spouse regarding pateints pain and recommendations to better manage pain, contacted Dr. Tuyet Betancur and Dr Demetris Navarro office left message regaring patients pain level and lack of sleep. Pt instructed in seated strengthening to bilateral LEs, Mod I with taxing effort for sit to stand transfers from couch pt declined bed mobility tng, declined gait tng and remaining exercises states he is too tired to participate. Will FU with patient later in the week   PLAN FOR NEXT VISIT: Continue strength tng and gait  DISCHARGE PLANS: POC and Discharge plans discussed pt understands and agrees eventual DC once goals have been met or max HC benefits have been achieved.  Frequency is 2wk3, 4 visits remaining anticipated DC  8/3/2023

## 2023-07-26 ENCOUNTER — HOSPITAL ENCOUNTER (OUTPATIENT)
Facility: HOSPITAL | Age: 38
Setting detail: SPECIMEN
Discharge: HOME | End: 2023-07-29
Payer: COMMERCIAL

## 2023-07-26 ENCOUNTER — HOME CARE VISIT (OUTPATIENT)
Age: 38
End: 2023-07-26
Payer: COMMERCIAL

## 2023-07-26 VITALS
TEMPERATURE: 98.7 F | HEART RATE: 99 BPM | OXYGEN SATURATION: 97 % | DIASTOLIC BLOOD PRESSURE: 97 MMHG | SYSTOLIC BLOOD PRESSURE: 152 MMHG

## 2023-07-26 LAB
ANION GAP SERPL CALC-SCNC: 6 MMOL/L (ref 3–18)
BASOPHILS # BLD: 0 K/UL (ref 0–0.1)
BASOPHILS NFR BLD: 0 % (ref 0–2)
BUN SERPL-MCNC: 16 MG/DL (ref 7–18)
BUN/CREAT SERPL: 20 (ref 12–20)
CALCIUM SERPL-MCNC: 9 MG/DL (ref 8.5–10.1)
CHLORIDE SERPL-SCNC: 102 MMOL/L (ref 100–111)
CO2 SERPL-SCNC: 30 MMOL/L (ref 21–32)
CREAT SERPL-MCNC: 0.8 MG/DL (ref 0.6–1.3)
CRP SERPL-MCNC: 4.9 MG/DL (ref 0–0.3)
DIFFERENTIAL METHOD BLD: ABNORMAL
EOSINOPHIL # BLD: 0.1 K/UL (ref 0–0.4)
EOSINOPHIL NFR BLD: 1 % (ref 0–5)
ERYTHROCYTE [DISTWIDTH] IN BLOOD BY AUTOMATED COUNT: 13.3 % (ref 11.6–14.5)
ERYTHROCYTE [SEDIMENTATION RATE] IN BLOOD: 38 MM/HR (ref 0–15)
GLUCOSE SERPL-MCNC: 84 MG/DL (ref 74–99)
HCT VFR BLD AUTO: 41.5 % (ref 36–48)
HGB BLD-MCNC: 13 G/DL (ref 13–16)
IMM GRANULOCYTES # BLD AUTO: 0 K/UL (ref 0–0.04)
IMM GRANULOCYTES NFR BLD AUTO: 1 % (ref 0–0.5)
LYMPHOCYTES # BLD: 1.4 K/UL (ref 0.9–3.6)
LYMPHOCYTES NFR BLD: 18 % (ref 21–52)
MCH RBC QN AUTO: 27.7 PG (ref 24–34)
MCHC RBC AUTO-ENTMCNC: 31.3 G/DL (ref 31–37)
MCV RBC AUTO: 88.5 FL (ref 78–100)
MONOCYTES # BLD: 0.7 K/UL (ref 0.05–1.2)
MONOCYTES NFR BLD: 9 % (ref 3–10)
NEUTS SEG # BLD: 5.5 K/UL (ref 1.8–8)
NEUTS SEG NFR BLD: 72 % (ref 40–73)
NRBC # BLD: 0 K/UL (ref 0–0.01)
NRBC BLD-RTO: 0 PER 100 WBC
PLATELET # BLD AUTO: 200 K/UL (ref 135–420)
PMV BLD AUTO: 10.8 FL (ref 9.2–11.8)
POTASSIUM SERPL-SCNC: 4 MMOL/L (ref 3.5–5.5)
RBC # BLD AUTO: 4.69 M/UL (ref 4.35–5.65)
SODIUM SERPL-SCNC: 138 MMOL/L (ref 136–145)
WBC # BLD AUTO: 7.6 K/UL (ref 4.6–13.2)

## 2023-07-26 PROCEDURE — G0157 HHC PT ASSISTANT EA 15: HCPCS

## 2023-07-26 PROCEDURE — 85025 COMPLETE CBC W/AUTO DIFF WBC: CPT

## 2023-07-26 PROCEDURE — G0299 HHS/HOSPICE OF RN EA 15 MIN: HCPCS

## 2023-07-26 PROCEDURE — 86140 C-REACTIVE PROTEIN: CPT

## 2023-07-26 PROCEDURE — 80048 BASIC METABOLIC PNL TOTAL CA: CPT

## 2023-07-26 PROCEDURE — 85652 RBC SED RATE AUTOMATED: CPT

## 2023-07-27 NOTE — HOME HEALTH
SUBJECTIVE: Pt c/o back pain, pt received call back from Natalee Harris with medication changes which have been updated, no falls reported  Recievied VM message from MDs office MD does not want pt to perform any exercises, only wants pt to ambulate  CAREGIVER INVOLVEMENT/ASSISTANCE NEEDED FOR: Spouse and children assist pt as needed   SUBJECTIVE: See interventions  PATIENT EDUCATION PROVIDED THIS VISIT: Pt educated in pain management, use, frequency and complications associated with use of complications    PATIENT RESPONSE TO EDUCATION: Pt verbalizes understanding of education    PATIENT RESPONSE TO TREATMENT/ASSESSMENT OF PROGRESS TOWARD GOALS:  Pt presents with pain in back, states pain has improved since meds have been changed. Sair negotiation with use of bilateral rails. Gait outside on uneven surfaces with FWW pt requires VCs to improve foot clearance and gait speed. Mod I transfers with taxing effort due to increased pain  with transition from sit to stand. PLAN FOR NEXT VISIT: Next visit will address gait, transfers    DISCHARGE PLANS: POC and Discharge plans discussed pt understands and agrees eventual DC once goals have been met or max HC benefits have been achieved.   2 visits remaining PT discharge scheduled for 8/3/2023

## 2023-07-28 VITALS
OXYGEN SATURATION: 99 % | DIASTOLIC BLOOD PRESSURE: 93 MMHG | TEMPERATURE: 97.2 F | RESPIRATION RATE: 16 BRPM | HEART RATE: 91 BPM | SYSTOLIC BLOOD PRESSURE: 152 MMHG

## 2023-07-28 NOTE — HOME HEALTH
Skilled reason for visit: [address], ASSESS PICC LINE and dsg change, DRAW LABS  Caregiver involvement:COOKS, CLEANS AND TAKES PATIENT TO MD APPT. Medications reviewed and all medications are available in the home this visit. The following education was provided regarding medications:  INSTRUCTED ON IMPORTANCE OF MEDICATION COMPLIANCY. .    MD notified of any discrepancies/look a-like medications/medication interactions: NA  Medications are IN HOME AND EFFECTIVE  at this time. Home health supplies by type and quantity ordered/delivered this visit include: NA    Patient education provided this visit: WOUND CARE, SEE NSG INTERVENTIONS, INSTRUCTED ON S/S OF INFECTION AT SURGICAL SITE/ BACK. INSTRUCTED ON USE OF PICC LINE AND IV THERAPY. WIFE DOES IV INFUSION. LABS DRAWN AND PICC ARE COMLETED. LABS TO [facility]. Sharps education provided: YES     Patient level of understanding of education provided:GOOD, VERBALIZED UNDERSTANDING. Skilled Care Performed this visit:SN. ASSESSMENT OF PICC LINE,dsg changed. CHANGED DSG ON LUMBAR . LABS DRAWN AND TAKEN TO [facility]. TEACHING ON DISEASE PROCESS. TAUGHT PATIENT ON CONSTIPATION WITH USE OF OPIOIDS. SN HAD EXTENDED VISIT REVIEWING MEDICATIONS WITH PATIENT. PATICASPER WAS IN ER WITH BACK SPASMS THIS WEEK. NEW MEDS MAKES PATIENT TIRED. Patient response to procedure performed:  GOOD. Patient's Progress towards personal goals: PROGRESSING. Home exercise program: DEEP BREATHING EXERCISES TO HELP PREVENT PNEUMONIA. Continued need for the following skills: Nursing. NEXT VISIT: DSG CHANGE TO LUMBAR SURGICAL SITE. TEACHING ON DISEASE PROCESS. Patient and/or caregiver notified and agrees to changes in the Plan of Care: Yes. The following discharge planning was discussed with the pt/caregiver: PATIENT WILL BE DISCHARGED WHEN GOALS ARE MET AND PATIENT IS STABLE.

## 2023-07-31 ENCOUNTER — HOSPITAL ENCOUNTER (OUTPATIENT)
Facility: HOSPITAL | Age: 38
Setting detail: SPECIMEN
Discharge: HOME | End: 2023-08-03
Payer: COMMERCIAL

## 2023-07-31 ENCOUNTER — HOME CARE VISIT (OUTPATIENT)
Age: 38
End: 2023-07-31
Payer: COMMERCIAL

## 2023-07-31 LAB
ANION GAP SERPL CALC-SCNC: 7 MMOL/L (ref 3–18)
BUN SERPL-MCNC: 22 MG/DL (ref 7–18)
BUN/CREAT SERPL: 28 (ref 12–20)
CALCIUM SERPL-MCNC: 9 MG/DL (ref 8.5–10.1)
CHLORIDE SERPL-SCNC: 103 MMOL/L (ref 100–111)
CO2 SERPL-SCNC: 27 MMOL/L (ref 21–32)
CREAT SERPL-MCNC: 0.78 MG/DL (ref 0.6–1.3)
CRP SERPL-MCNC: 2.3 MG/DL (ref 0–0.3)
ERYTHROCYTE [SEDIMENTATION RATE] IN BLOOD: 33 MM/HR (ref 0–15)
GLUCOSE SERPL-MCNC: 102 MG/DL (ref 74–99)
POTASSIUM SERPL-SCNC: 3.9 MMOL/L (ref 3.5–5.5)
SODIUM SERPL-SCNC: 137 MMOL/L (ref 136–145)

## 2023-07-31 PROCEDURE — 80048 BASIC METABOLIC PNL TOTAL CA: CPT

## 2023-07-31 PROCEDURE — 85652 RBC SED RATE AUTOMATED: CPT

## 2023-07-31 PROCEDURE — 86140 C-REACTIVE PROTEIN: CPT

## 2023-07-31 PROCEDURE — G0299 HHS/HOSPICE OF RN EA 15 MIN: HCPCS

## 2023-08-01 ENCOUNTER — HOME CARE VISIT (OUTPATIENT)
Age: 38
End: 2023-08-01
Payer: COMMERCIAL

## 2023-08-01 VITALS
RESPIRATION RATE: 16 BRPM | TEMPERATURE: 97.2 F | OXYGEN SATURATION: 97 % | DIASTOLIC BLOOD PRESSURE: 92 MMHG | HEART RATE: 92 BPM | SYSTOLIC BLOOD PRESSURE: 162 MMHG

## 2023-08-01 PROCEDURE — G0157 HHC PT ASSISTANT EA 15: HCPCS

## 2023-08-02 VITALS
HEART RATE: 88 BPM | SYSTOLIC BLOOD PRESSURE: 148 MMHG | OXYGEN SATURATION: 99 % | DIASTOLIC BLOOD PRESSURE: 98 MMHG | TEMPERATURE: 98.7 F

## 2023-08-02 ASSESSMENT — ENCOUNTER SYMPTOMS: PAIN LOCATION - PAIN QUALITY: SORE, ACHING

## 2023-08-02 NOTE — HOME HEALTH
Skilled reason for visit: [address], ASSESS PICC LINE and dsg change, DRAW LABS  Caregiver involvement:COOKS, CLEANS AND TAKES PATIENT TO MD APPT. Medications reviewed and all medications are available in the home this visit. The following education was provided regarding medications:  INSTRUCTED ON IMPORTANCE OF MEDICATION COMPLIANCY. .    MD notified of any discrepancies/look a-like medications/medication interactions: NA  Medications are IN HOME AND EFFECTIVE  at this time. Home health supplies by type and quantity ordered/delivered this visit include: NA    Patient education provided this visit: WOUND CARE, SEE NSG INTERVENTIONS, INSTRUCTED ON S/S OF INFECTION AT SURGICAL SITE/ BACK. INSTRUCTED ON USE OF PICC LINE AND IV THERAPY. WIFE DOES IV INFUSION. LABS DRAWN AND PICC ARE COMPLETED. LABS TO [facility]. Sharps education provided: YES     Patient level of understanding of education provided:GOOD, VERBALIZED UNDERSTANDING. Skilled Care Performed this visit:SN. ASSESSMENT OF PICC LINE,dsg changed. CHANGED DSG ON LUMBAR . LABS DRAWN AND TAKEN TO [facility]. TEACHING ON DISEASE PROCESS. TAUGHT PATIENT ON CONSTIPATION WITH USE OF OPIOIDS. SN HAD EXTENDED VISIT REVIEWING MEDICATIONS WITH PATIENT. PATIENT HAS NOT HAD BACK SPASMS THIS WEEK. NEW MEDS MAKES PATIENT TIRED. Patient response to procedure performed:  GOOD. Patient's Progress towards personal goals: PROGRESSING. Home exercise program: DEEP BREATHING EXERCISES TO HELP PREVENT PNEUMONIA. Continued need for the following skills: Nursing. NEXT VISIT: DSG CHANGE TO LUMBAR SURGICAL SITE. TEACHING ON DISEASE PROCESS. Patient and/or caregiver notified and agrees to changes in the Plan of Care: Yes. The following discharge planning was discussed with the pt/caregiver: PATIENT WILL BE DISCHARGED WHEN GOALS ARE MET AND PATIENT IS STABLE.

## 2023-08-02 NOTE — HOME HEALTH
SUBJECTIVE: Pt reports no back pain today, complains of bilateal hip pain, states he is doing so much better and has been able to get out and walk more oftern with minimal pain  CAREGIVER INVOLVEMENT/ASSISTANCE NEEDED FOR: Spouse and daughters assist pt as needed   Assessment and Summary of Care:  Patient's current functional status before discharge is as follows  Strength: 4/5 Bilaterally  Bed Mobility: Pt is sleeping in recliner  Transfers: Ind transfers with decreased effort  Gait/WC mobility: 500ft outside on uneven surfaces Ind decreased nora, VCs for foot clearance on right   Stairs: Rails/CGA x 5 steps  Special Tests: None  Recommendations:  Pt instructed to walk with assistance and as tolerated, sit to stand transfers to improve LE strength

## 2023-08-04 ENCOUNTER — HOME CARE VISIT (OUTPATIENT)
Age: 38
End: 2023-08-04
Payer: COMMERCIAL

## 2023-08-04 PROCEDURE — G0151 HHCP-SERV OF PT,EA 15 MIN: HCPCS

## 2023-08-07 ENCOUNTER — HOME CARE VISIT (OUTPATIENT)
Age: 38
End: 2023-08-07
Payer: COMMERCIAL

## 2023-08-07 ENCOUNTER — HOSPITAL ENCOUNTER (OUTPATIENT)
Facility: HOSPITAL | Age: 38
Setting detail: SPECIMEN
Discharge: HOME | End: 2023-08-10
Payer: COMMERCIAL

## 2023-08-07 VITALS
TEMPERATURE: 98 F | RESPIRATION RATE: 12 BRPM | DIASTOLIC BLOOD PRESSURE: 96 MMHG | OXYGEN SATURATION: 97 % | SYSTOLIC BLOOD PRESSURE: 148 MMHG | HEART RATE: 95 BPM

## 2023-08-07 LAB
ANION GAP SERPL CALC-SCNC: 3 MMOL/L (ref 3–18)
BASOPHILS # BLD: 0 K/UL (ref 0–0.1)
BASOPHILS NFR BLD: 1 % (ref 0–2)
BUN SERPL-MCNC: 17 MG/DL (ref 7–18)
BUN/CREAT SERPL: 24 (ref 12–20)
CALCIUM SERPL-MCNC: 8.5 MG/DL (ref 8.5–10.1)
CHLORIDE SERPL-SCNC: 105 MMOL/L (ref 100–111)
CO2 SERPL-SCNC: 29 MMOL/L (ref 21–32)
CREAT SERPL-MCNC: 0.72 MG/DL (ref 0.6–1.3)
CRP SERPL-MCNC: 1.6 MG/DL (ref 0–0.3)
DIFFERENTIAL METHOD BLD: ABNORMAL
EOSINOPHIL # BLD: 0.1 K/UL (ref 0–0.4)
EOSINOPHIL NFR BLD: 2 % (ref 0–5)
ERYTHROCYTE [DISTWIDTH] IN BLOOD BY AUTOMATED COUNT: 13.7 % (ref 11.6–14.5)
ERYTHROCYTE [SEDIMENTATION RATE] IN BLOOD: 19 MM/HR (ref 0–15)
GLUCOSE SERPL-MCNC: 100 MG/DL (ref 74–99)
HCT VFR BLD AUTO: 40.6 % (ref 36–48)
HGB BLD-MCNC: 12.5 G/DL (ref 13–16)
IMM GRANULOCYTES # BLD AUTO: 0 K/UL (ref 0–0.04)
IMM GRANULOCYTES NFR BLD AUTO: 1 % (ref 0–0.5)
LYMPHOCYTES # BLD: 1.1 K/UL (ref 0.9–3.6)
LYMPHOCYTES NFR BLD: 26 % (ref 21–52)
MCH RBC QN AUTO: 27.4 PG (ref 24–34)
MCHC RBC AUTO-ENTMCNC: 30.8 G/DL (ref 31–37)
MCV RBC AUTO: 88.8 FL (ref 78–100)
MONOCYTES # BLD: 0.4 K/UL (ref 0.05–1.2)
MONOCYTES NFR BLD: 9 % (ref 3–10)
NEUTS SEG # BLD: 2.6 K/UL (ref 1.8–8)
NEUTS SEG NFR BLD: 62 % (ref 40–73)
NRBC # BLD: 0 K/UL (ref 0–0.01)
NRBC BLD-RTO: 0 PER 100 WBC
PLATELET # BLD AUTO: 154 K/UL (ref 135–420)
PMV BLD AUTO: 11.3 FL (ref 9.2–11.8)
POTASSIUM SERPL-SCNC: 3.9 MMOL/L (ref 3.5–5.5)
RBC # BLD AUTO: 4.57 M/UL (ref 4.35–5.65)
SODIUM SERPL-SCNC: 137 MMOL/L (ref 136–145)
WBC # BLD AUTO: 4.2 K/UL (ref 4.6–13.2)

## 2023-08-07 PROCEDURE — 80048 BASIC METABOLIC PNL TOTAL CA: CPT

## 2023-08-07 PROCEDURE — 86140 C-REACTIVE PROTEIN: CPT

## 2023-08-07 PROCEDURE — 85025 COMPLETE CBC W/AUTO DIFF WBC: CPT

## 2023-08-07 PROCEDURE — G0299 HHS/HOSPICE OF RN EA 15 MIN: HCPCS

## 2023-08-07 PROCEDURE — 85652 RBC SED RATE AUTOMATED: CPT

## 2023-08-07 ASSESSMENT — ENCOUNTER SYMPTOMS: PAIN LOCATION - PAIN QUALITY: ACHY

## 2023-08-07 NOTE — HOME HEALTH
During this visit, patient presents with the following clinical findings:  . SUBJECTIVE: Patient denies fall since evaluation. Patient reported he went to outlet store yesterday after f/u appointment with MD. Patient stated he felt fine. Patient c/o constant achy  pain on lower back  with highest pain level of 5/10 and least pain level of 2/10 for the past 24 hours. Patient manages pain by taking pain medication as prescribed by MD, application of cold pack for soreness and achy  type of pain /hot  packs for achy, stiffness type of pain on painful area x 20 mins, positioning and relaxation. Patient reported he last took 1 tab of Dilaudid at 7am.  . CAREGIVER ASSISTANCE: No cg present during this visit. Korina Jj MEDICATIONS RECONCILED AND UPDATED: no changes in medications at this time  . MMT: R hip flex 5/5   R hip Abd 5/5   R hip add 5/5   R hip ext. 5/5  R knee flex 5/5  R knee ext. 5/5    R ankle DF 4-/5  L hip flex 5/5  L hip Abd 5/5  L hip ext. 5/5    L hip add 5/5  L knee flex 5/5  L knee ext. 5/5  L ankle DF 5/5    FTSTS:  unable. requires bairon UE push up to complete sit to stand  compared to  L hip flexion 3/5, L knee flexion 3/5, L knee extension 3/5,   R hip flexion 3/5, R knee flexion 3/5, R knee extension 3/5,   during initial evaluation. BALANCE: low  risk of falls evidenced by  26/28 on Tinetti balance and gait test and completed TUG test in 13  seconds without AD compared to NA during initial evaluation. .  BED MOBILITY: indep in bed mobility using log rolling technique but patient prefers to sleep on recliner d/t episodes of back muscle spasms from supine <> sit Horacio log roll method during initial evaluation. TRANSFERS:  indep in transfers to and from bed, chair, toilet, and car using FWW/cane without cues from sit to stand with cga. VC for scooting to edge of seat and placement of BUE in order for ease with anterior weight shift during initial evaluation.   GAIT: ambulate 300+ ft indep  on even

## 2023-08-10 NOTE — HOME HEALTH
Skilled reason for visit: [address], ASSESS PICC LINE and dsg change, DRAW LABS  Caregiver involvement:COOKS, CLEANS AND TAKES PATIENT TO MD APPT. Medications reviewed and all medications are available in the home this visit. The following education was provided regarding medications:  INSTRUCTED ON IMPORTANCE OF MEDICATION COMPLIANCY. .    MD notified of any discrepancies/look a-like medications/medication interactions: NA  Medications are IN HOME AND EFFECTIVE  at this time. Home health supplies by type and quantity ordered/delivered this visit include: NA    Patient education provided this visit: WOUND CARE, SEE NSG INTERVENTIONS, INSTRUCTED ON S/S OF INFECTION AT SURGICAL SITE/ BACK. INSTRUCTED ON USE OF PICC LINE AND IV THERAPY. WIFE DOES IV INFUSION. LABS DRAWN AND PICC CARE ARE COMPLETED. LABS TO [facility]. EXTENDED VISIT DUE TO PROBLEMS WITH PICC CHANGE AND PROBLEMS DRAWING BLOOD. Sharps education provided: YES      Patient level of understanding of education provided:GOOD, VERBALIZED UNDERSTANDING. Skilled Care Performed this visit:SN. ASSESSMENT OF PICC LINE,dsg changed. WD HEALED, LUMBAR AREA . LABS DRAWN AND TAKEN TO [facility]. TEACHING ON DISEASE PROCESS. TAUGHT PATIENT ON CONSTIPATION WITH USE OF OPIOIDS. PATIENT HAS NOT HAD BACK SPASMS THIS WEEK. NEW MEDS MAKES PATIENT TIRED. Patient response to procedure performed:  GOOD. Patient's Progress towards personal goals: PROGRESSING. Home exercise program: DEEP BREATHING EXERCISES TO HELP PREVENT PNEUMONIA. Continued need for the following skills: Nursing. NEXT VISIT: DSG CHANGE TO  PICC LINE, LAB DRAW, TEACHING ON DISEASE PROCESS. Patient and/or caregiver notified and agrees to changes in the Plan of Care: Yes. The following discharge planning was discussed with the pt/caregiver: PATIENT WILL BE DISCHARGED WHEN GOALS ARE MET AND PATIENT IS STABLE.

## 2023-08-14 ENCOUNTER — HOME CARE VISIT (OUTPATIENT)
Age: 38
End: 2023-08-14
Payer: COMMERCIAL

## 2023-08-14 PROCEDURE — G0299 HHS/HOSPICE OF RN EA 15 MIN: HCPCS

## 2023-08-17 VITALS
DIASTOLIC BLOOD PRESSURE: 78 MMHG | TEMPERATURE: 97 F | OXYGEN SATURATION: 98 % | SYSTOLIC BLOOD PRESSURE: 132 MMHG | HEART RATE: 89 BPM | RESPIRATION RATE: 16 BRPM

## 2023-08-17 NOTE — HOME HEALTH
Skilled reason for visit: [address], O LABS DUE. Caregiver involvement:COOKS, CLEANS AND TAKES PATIENT TO MD APPT. Medications reviewed and all medications are available in the home this visit. The following education was provided regarding medications:  INSTRUCTED ON IMPORTANCE OF MEDICATION COMPLIANCY. .    MD notified of any discrepancies/look a-like medications/medication interactions: NA  Medications are IN HOME AND EFFECTIVE  at this time. Home health supplies by type and quantity ordered/delivered this visit include: NA    Patient education provided this visit: WOUND CARE, SEE NSG INTERVENTIONS, INSTRUCTED ON S/S OF INFECTION AT [address] [place] [address] education provided: YES      Patient level of understanding of education provided:GOOD, VERBALIZED UNDERSTANDING. Skilled Care Performed this visit:SN. ASSESSMENT OF WD HEALED, LUMBAR AREA . Kaur Solano TEACHING ON DISEASE PROCESS. TAUGHT PATIENT ON CONSTIPATION WITH USE OF OPIOIDS. PATIENT HAS NOT HAD BACK SPASMS THIS WEEK. NEW MEDS MAKES PATIENT TIRED. Patient response to procedure performed:  GOOD. Patient's Progress towards personal goals: PROGRESSING. Home exercise program: DEEP BREATHING EXERCISES TO HELP PREVENT PNEUMONIA. Continued need for the following skills: Nursing. NEXT VISIT: , TEACHING ON DISEASE PROCESS. Patient and/or caregiver notified and agrees to changes in the Plan of Care: Yes. The following discharge planning was discussed with the pt/caregiver: PATIENT WILL BE DISCHARGED/ WHEN GOALS ARE MET AND PATIENT IS STABLE. 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

## 2023-08-28 ENCOUNTER — HOME CARE VISIT (OUTPATIENT)
Age: 38
End: 2023-08-28
Payer: COMMERCIAL

## 2023-09-14 ENCOUNTER — HOSPITAL ENCOUNTER (OUTPATIENT)
Facility: HOSPITAL | Age: 38
Setting detail: RECURRING SERIES
Discharge: HOME | End: 2023-09-17
Payer: COMMERCIAL

## 2023-09-14 PROCEDURE — 97535 SELF CARE MNGMENT TRAINING: CPT

## 2023-09-14 PROCEDURE — 97162 PT EVAL MOD COMPLEX 30 MIN: CPT

## 2023-09-18 ENCOUNTER — APPOINTMENT (OUTPATIENT)
Facility: HOSPITAL | Age: 38
End: 2023-09-18
Payer: COMMERCIAL

## 2023-09-20 ENCOUNTER — HOSPITAL ENCOUNTER (OUTPATIENT)
Facility: HOSPITAL | Age: 38
Setting detail: RECURRING SERIES
End: 2023-09-20
Payer: COMMERCIAL

## 2023-09-21 PROBLEM — T81.40XA POSTOPERATIVE INFECTION, UNSPECIFIED TYPE, INITIAL ENCOUNTER: Status: ACTIVE | Noted: 2023-09-21

## 2023-09-22 ENCOUNTER — HOSPITAL ENCOUNTER (OUTPATIENT)
Facility: HOSPITAL | Age: 38
Setting detail: RECURRING SERIES
End: 2023-09-22
Payer: COMMERCIAL

## 2023-09-22 NOTE — PROGRESS NOTES
[address] [facility] PHYSICAL THERAPY AT [facility]   [address] [facility], [address]   Phone: [phone] Fax [phone]  DISCHARGE SUMMARY  Patient Name: Jonathan Rodriguez IV : 1985   Treatment/Medical Diagnosis: Other low back pain [M54.59]   Referral Source: Cynthia Barnes MD     Date of Initial Visit: 23 Attended Visits: 1 Missed Visits: 0     CURRENT GOAL STATUS  Short Term Goals: To be accomplished in 2 weeks:  1- Goal: Pt to be compliant with initial HEP to improve performance of ADLs. Status at last note/certification: Established and reviewed with pt  Current: Unable to Reassess  2- Goal: Pt to improve lumbar AROM to 100% in all directions without pain to increase ability to return to recreational activities. Status at last note/certification: Lumbar AROM:                                           AROM (% of full)                  Right Left Effect   Flexion 75%     Extension 75%     Side Bend 50% 50% Left p! Rotation 75% 50% Left p! 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 Status at last note/certification: Strength:    Right (/5) Left (/5)   Hip     Flexion 4 4-             Abduction 4 4             Adduction 4- 4-             Extension 3+ 3+             ER 4 4-             IR 3+ 3+ p! Current: Unable to Reassess  2- Goal: Pt to demonstrate trunk extensor and flexor endurance >30 seconds without pain to improve ability to maintain prolonged positioning when performing work tasks. Status at last note/certification: Assess at first follow up  Current: Unable to Reassess  3- Goal: Pt to improve bilateral knee strength to 5/5 to improve participation in recreational activities.   Status at last note/certification: Strength:    Right (/5) Left (/5)   Knee   Extension 4+ 4+
hip flexibility, gait deviations on even surfaces, and decreased bed mobility secondary to pain. Patient scored 62 on FOTO indicating decreased function and quality of life. Pt would benefit from skilled PT to address above deficits, limitations and impairments, to facilitate a return to prior level of function, increase ability to participate in recreational activities, facilitate ability to return to work duty performance, and improve overall quality of life. Evaluation Complexity:  History:  MEDIUM  Complexity : 1-2 comorbidities / personal factors will impact the outcome/ POC ; Examination:  HIGH Complexity : 4+ Standardized tests and measures addressing body structure, function, activity limitation and / or participation in recreation  ;Presentation:  MEDIUM Complexity : Evolving with changing characteristics  ; Clinical Decision Making:  MEDIUM Complexity : FOTO score of 26-74 FOTO score = an established functional score where 100 = no disability  Overall Complexity Rating: MEDIUM  Problem List: pain affecting function, decrease ROM, decrease strength, impaired gait/balance, decrease ADL/functional abilities, decrease activity tolerance, decrease flexibility/joint mobility, and decrease transfer abilities    Treatment Plan may include any combination of the followin Therapeutic Exercise, 26376 Neuromuscular Re-Education, 98473 Manual Therapy, 66653 Therapeutic Activity, 46259 Self Care/Home Management, 67029 Electrical Stim unattended, 98553 Aquatic Therapy, 15283 Gait Training, and 42147 Ultrasound  Patient / Family readiness to learn indicated by: asking questions, trying to perform skills, interest, return verbalization , and return demonstration   Persons(s) to be included in education: patient (P)  Barriers to Learning/Limitations: none  Measures taken if barriers to learning present: n/a  Patient Goal (s): \"to lose  lbs\" & \" to get back to the gym & sports\" & \"to be able to get back to
instruct in home and community integration to address functional deficits and attain remaining goals.     [x]  See Plan of Care - for goals and assessment     PLAN  []  Upgrade activities as tolerated     [x]  Continue plan of care  []  Update interventions per flow sheet       []  Other:_      Valeri Negrete, PT 9/14/2023  4:12 PM

## 2023-09-27 ENCOUNTER — APPOINTMENT (OUTPATIENT)
Facility: HOSPITAL | Age: 38
End: 2023-09-27
Payer: COMMERCIAL

## 2023-09-27 ENCOUNTER — HOME HEALTH ADMISSION (OUTPATIENT)
Age: 38
End: 2023-09-27

## 2023-09-28 PROBLEM — T81.49XA POSTOPERATIVE ABSCESS: Status: ACTIVE | Noted: 2023-09-28

## 2023-09-29 ENCOUNTER — APPOINTMENT (OUTPATIENT)
Facility: HOSPITAL | Age: 38
End: 2023-09-29
Payer: COMMERCIAL

## 2023-10-02 ENCOUNTER — APPOINTMENT (OUTPATIENT)
Facility: HOSPITAL | Age: 38
End: 2023-10-02
Payer: COMMERCIAL

## 2023-10-04 ENCOUNTER — APPOINTMENT (OUTPATIENT)
Facility: HOSPITAL | Age: 38
End: 2023-10-04
Payer: COMMERCIAL

## 2023-10-05 PROBLEM — B95.61 MSSA BACTEREMIA: Status: ACTIVE | Noted: 2023-10-05

## 2023-10-05 PROBLEM — R78.81 MSSA BACTEREMIA: Status: ACTIVE | Noted: 2023-10-05

## 2023-10-09 ENCOUNTER — HOME HEALTH ADMISSION (OUTPATIENT)
Age: 38
End: 2023-10-09
Payer: COMMERCIAL

## 2023-10-09 ENCOUNTER — APPOINTMENT (OUTPATIENT)
Facility: HOSPITAL | Age: 38
End: 2023-10-09
Payer: COMMERCIAL

## 2023-10-10 ENCOUNTER — HOME CARE VISIT (OUTPATIENT)
Age: 38
End: 2023-10-10
Payer: COMMERCIAL

## 2023-10-10 ENCOUNTER — HOSPITAL ENCOUNTER (OUTPATIENT)
Facility: HOSPITAL | Age: 38
Setting detail: SPECIMEN
Discharge: HOME | End: 2023-10-13
Payer: COMMERCIAL

## 2023-10-10 VITALS
TEMPERATURE: 97.8 F | HEART RATE: 82 BPM | RESPIRATION RATE: 16 BRPM | OXYGEN SATURATION: 96 % | DIASTOLIC BLOOD PRESSURE: 78 MMHG | SYSTOLIC BLOOD PRESSURE: 132 MMHG

## 2023-10-10 LAB
ANION GAP SERPL CALC-SCNC: 5 MMOL/L (ref 3–18)
BASOPHILS # BLD: 0 K/UL (ref 0–0.1)
BASOPHILS NFR BLD: 1 % (ref 0–2)
BUN SERPL-MCNC: 13 MG/DL (ref 7–18)
BUN/CREAT SERPL: 17 (ref 12–20)
CALCIUM SERPL-MCNC: 8.9 MG/DL (ref 8.5–10.1)
CHLORIDE SERPL-SCNC: 105 MMOL/L (ref 100–111)
CO2 SERPL-SCNC: 28 MMOL/L (ref 21–32)
CREAT SERPL-MCNC: 0.75 MG/DL (ref 0.6–1.3)
CRP SERPL-MCNC: 1.2 MG/DL (ref 0–0.3)
DIFFERENTIAL METHOD BLD: ABNORMAL
EOSINOPHIL # BLD: 0 K/UL (ref 0–0.4)
EOSINOPHIL NFR BLD: 1 % (ref 0–5)
ERYTHROCYTE [DISTWIDTH] IN BLOOD BY AUTOMATED COUNT: 14.2 % (ref 11.6–14.5)
ERYTHROCYTE [SEDIMENTATION RATE] IN BLOOD: 34 MM/HR (ref 0–15)
GLUCOSE SERPL-MCNC: 105 MG/DL (ref 74–99)
HCT VFR BLD AUTO: 37.3 % (ref 36–48)
HGB BLD-MCNC: 11.9 G/DL (ref 13–16)
IMM GRANULOCYTES # BLD AUTO: 0 K/UL (ref 0–0.04)
IMM GRANULOCYTES NFR BLD AUTO: 0 % (ref 0–0.5)
LYMPHOCYTES # BLD: 1.1 K/UL (ref 0.9–3.6)
LYMPHOCYTES NFR BLD: 28 % (ref 21–52)
MCH RBC QN AUTO: 27.4 PG (ref 24–34)
MCHC RBC AUTO-ENTMCNC: 31.9 G/DL (ref 31–37)
MCV RBC AUTO: 85.7 FL (ref 78–100)
MONOCYTES # BLD: 0.4 K/UL (ref 0.05–1.2)
MONOCYTES NFR BLD: 10 % (ref 3–10)
NEUTS SEG # BLD: 2.3 K/UL (ref 1.8–8)
NEUTS SEG NFR BLD: 60 % (ref 40–73)
NRBC # BLD: 0 K/UL (ref 0–0.01)
NRBC BLD-RTO: 0 PER 100 WBC
PLATELET # BLD AUTO: 197 K/UL (ref 135–420)
PMV BLD AUTO: 11.2 FL (ref 9.2–11.8)
POTASSIUM SERPL-SCNC: 3.6 MMOL/L (ref 3.5–5.5)
RBC # BLD AUTO: 4.35 M/UL (ref 4.35–5.65)
SODIUM SERPL-SCNC: 138 MMOL/L (ref 136–145)
WBC # BLD AUTO: 3.8 K/UL (ref 4.6–13.2)

## 2023-10-10 PROCEDURE — 85652 RBC SED RATE AUTOMATED: CPT

## 2023-10-10 PROCEDURE — 86140 C-REACTIVE PROTEIN: CPT

## 2023-10-10 PROCEDURE — G0299 HHS/HOSPICE OF RN EA 15 MIN: HCPCS

## 2023-10-10 PROCEDURE — 80048 BASIC METABOLIC PNL TOTAL CA: CPT

## 2023-10-10 PROCEDURE — 85025 COMPLETE CBC W/AUTO DIFF WBC: CPT

## 2023-10-11 ENCOUNTER — APPOINTMENT (OUTPATIENT)
Facility: HOSPITAL | Age: 38
End: 2023-10-11
Payer: COMMERCIAL

## 2023-10-11 NOTE — HOME HEALTH
Skilled services/Home bound verification:     Skilled Reason for admission/summary of clinical condition:  IV ABX teaching, labs, medication management and wound care  This patient is homebound for the following reasons Requires considerable and taxing effort to leave the home , Requires the assistance of 1 or more persons to leave the home  and Only leaves the home for medical reasons or [employer] services and are infrequent and of short duration for other reasons . Caregiver: spouse. Caregiver assists with medication management, transportation. Medications reconciled and all medications are available in the home this visit. The following education was provided regarding medications: Take all medications as prescribed. Notify physician before stopping any medications. Medications are too early to assess effectiveness. at this time. High risk medication teaching regarding antibiotics and opioid/narcotics  was performed    physician notified of any discrepancies/look a like medications/medication interactions. NA    Home health supplies by type and quantity ordered/delivered this visit include: all supplies are in the home    Patient education provided this visit to include: Instructed patient to notify SN/PT of any adverse effects of antibiotic medications including rash, dizziness, nausea, diarrhea, or yeast infections. Instructed patient to take exact amount of narcotics prescribed, signs and symptoms of oversedation, notify SN/PT if oversedated. May cause constipation, notify SN/PT if no BM x 3 days. Patient level of understanding of education provided: Patient/Caregiver response to education: Verbalized understanding       Sharps Education Provided: NA  Patient response to procedure performed:  Patient tolerated wound care, PICC line dressing change well    Home exercise program/Homework provided: activity as tolerated.   Encouraged patient to use walker when ambulating and to

## 2023-10-12 ENCOUNTER — HOME CARE VISIT (OUTPATIENT)
Age: 38
End: 2023-10-12
Payer: COMMERCIAL

## 2023-10-12 PROCEDURE — G0299 HHS/HOSPICE OF RN EA 15 MIN: HCPCS

## 2023-10-13 VITALS
RESPIRATION RATE: 16 BRPM | OXYGEN SATURATION: 98 % | SYSTOLIC BLOOD PRESSURE: 128 MMHG | HEART RATE: 86 BPM | DIASTOLIC BLOOD PRESSURE: 62 MMHG | TEMPERATURE: 97.1 F

## 2023-10-13 ASSESSMENT — ENCOUNTER SYMPTOMS: PAIN LOCATION - PAIN QUALITY: SORE

## 2023-10-13 NOTE — HOME HEALTH
Skilled reason for visit: teaching wound care    Caregiver involvement: spouse helps with wound care and IV abx administration. Medications reviewed and all medications are available in the home this visit. The following education was provided regarding medications: Take all medications as prescribed. Notify physician before stopping any medications. .    MD notified of any discrepancies/look a-like medications/medication interactions: NA  Medications are effective, unable to assess effectiveness at this time. Patient is not taking all medications except ABX    Home health supplies by type and quantity ordered/delivered this visit include: NA    Patient education provided this visit: Instructed patient/caregiver to notify SN/PT of any adverse effects of antibiotic medications including rash, dizziness, nausea, diarrhea, or yeast infections. Sharps education provided: NA    Patient level of understanding of education provided: Patient/Caregiver response to education: Verbalized understanding       Patient response to procedure performed:  Patient denied pain during wound care    Agency Progress toward goals: See intervention tab for progressing toward goals      Patient's Progress towards personal goals: See intervention tab for progressing toward goals      Home exercise program: Activity as tolerated. Patient ambulates with walker occasionally    Continued need for the following skills: Nursing. Plan for next visit: PICC dressing change    Patient and/or caregiver notified and agrees to changes in the Plan of Care: Yes. The following discharge planning was discussed with the pt/caregiver: Patient will be discharged when all goals are met.

## 2023-10-16 ENCOUNTER — HOME CARE VISIT (OUTPATIENT)
Age: 38
End: 2023-10-16
Payer: COMMERCIAL

## 2023-10-16 ENCOUNTER — APPOINTMENT (OUTPATIENT)
Facility: HOSPITAL | Age: 38
End: 2023-10-16
Payer: COMMERCIAL

## 2023-10-16 ENCOUNTER — HOSPITAL ENCOUNTER (OUTPATIENT)
Facility: HOSPITAL | Age: 38
Setting detail: SPECIMEN
Discharge: HOME | End: 2023-10-19
Payer: COMMERCIAL

## 2023-10-16 LAB
ANION GAP SERPL CALC-SCNC: 2 MMOL/L (ref 3–18)
BASOPHILS # BLD: 0 K/UL (ref 0–0.1)
BASOPHILS NFR BLD: 0 % (ref 0–2)
BUN SERPL-MCNC: 13 MG/DL (ref 7–18)
BUN/CREAT SERPL: 19 (ref 12–20)
CALCIUM SERPL-MCNC: 8.8 MG/DL (ref 8.5–10.1)
CHLORIDE SERPL-SCNC: 107 MMOL/L (ref 100–111)
CO2 SERPL-SCNC: 30 MMOL/L (ref 21–32)
CREAT SERPL-MCNC: 0.68 MG/DL (ref 0.6–1.3)
CRP SERPL-MCNC: 0.7 MG/DL (ref 0–0.3)
DIFFERENTIAL METHOD BLD: ABNORMAL
EOSINOPHIL # BLD: 0 K/UL (ref 0–0.4)
EOSINOPHIL NFR BLD: 1 % (ref 0–5)
ERYTHROCYTE [DISTWIDTH] IN BLOOD BY AUTOMATED COUNT: 15 % (ref 11.6–14.5)
ERYTHROCYTE [SEDIMENTATION RATE] IN BLOOD: 17 MM/HR (ref 0–15)
GLUCOSE SERPL-MCNC: 100 MG/DL (ref 74–99)
HCT VFR BLD AUTO: 39.8 % (ref 36–48)
HGB BLD-MCNC: 12.6 G/DL (ref 13–16)
IMM GRANULOCYTES # BLD AUTO: 0 K/UL (ref 0–0.04)
IMM GRANULOCYTES NFR BLD AUTO: 1 % (ref 0–0.5)
LYMPHOCYTES # BLD: 1 K/UL (ref 0.9–3.6)
LYMPHOCYTES NFR BLD: 29 % (ref 21–52)
MCH RBC QN AUTO: 27.3 PG (ref 24–34)
MCHC RBC AUTO-ENTMCNC: 31.7 G/DL (ref 31–37)
MCV RBC AUTO: 86.1 FL (ref 78–100)
MONOCYTES # BLD: 0.3 K/UL (ref 0.05–1.2)
MONOCYTES NFR BLD: 8 % (ref 3–10)
NEUTS SEG # BLD: 2 K/UL (ref 1.8–8)
NEUTS SEG NFR BLD: 61 % (ref 40–73)
NRBC # BLD: 0 K/UL (ref 0–0.01)
NRBC BLD-RTO: 0 PER 100 WBC
PLATELET # BLD AUTO: 152 K/UL (ref 135–420)
PMV BLD AUTO: 11 FL (ref 9.2–11.8)
POTASSIUM SERPL-SCNC: 4 MMOL/L (ref 3.5–5.5)
RBC # BLD AUTO: 4.62 M/UL (ref 4.35–5.65)
SODIUM SERPL-SCNC: 139 MMOL/L (ref 136–145)
WBC # BLD AUTO: 3.3 K/UL (ref 4.6–13.2)

## 2023-10-16 PROCEDURE — 85025 COMPLETE CBC W/AUTO DIFF WBC: CPT

## 2023-10-16 PROCEDURE — 80048 BASIC METABOLIC PNL TOTAL CA: CPT

## 2023-10-16 PROCEDURE — 85652 RBC SED RATE AUTOMATED: CPT

## 2023-10-16 PROCEDURE — G0299 HHS/HOSPICE OF RN EA 15 MIN: HCPCS

## 2023-10-16 PROCEDURE — 86140 C-REACTIVE PROTEIN: CPT

## 2023-10-18 ENCOUNTER — APPOINTMENT (OUTPATIENT)
Facility: HOSPITAL | Age: 38
End: 2023-10-18
Payer: COMMERCIAL

## 2023-10-18 VITALS
HEART RATE: 84 BPM | TEMPERATURE: 96.4 F | RESPIRATION RATE: 16 BRPM | DIASTOLIC BLOOD PRESSURE: 90 MMHG | OXYGEN SATURATION: 97 % | SYSTOLIC BLOOD PRESSURE: 142 MMHG

## 2023-10-18 ASSESSMENT — ENCOUNTER SYMPTOMS: PAIN LOCATION - PAIN QUALITY: SHARP

## 2023-10-18 NOTE — HOME HEALTH
Skilled reason for visit: wound care, lab draw picc dressing change     FYI, patient went to the ED last Saturday for right leg swelling and burning. Patient was concerned of blood clots. Patient stated ultrasound was performed but no new orders were given. Paitent stated physician said there were no blood clots and swelling could be from the recent surgery. Caregiver involvement: spouse helps with medication managment and transportation    Medications reviewed and all medications are available in the home this visit. The following education was provided regarding medications: Take all medications as prescribed. Notify physician before stopping any medications. MD notified of any discrepancies/look a-like medications/medication interactions: NA  Medications are somewhat effective       Home health supplies by type and quantity ordered/delivered this visit include: NA    Patient education provided this visit: Elevate legs to reduce swelling and wear compression socks. Sharps education provided: NA    Patient level of understanding of education provided: Patient/Caregiver response to education: Verbalized understanding       Patient response to procedure performed:  patient tolerated dressing change well    Agency Progress toward goals: See intervention tab for progressing toward goals      Patient's Progress towards personal goals: See intervention tab for progressing toward goals      Home exercise program: patient ambulates with walker    Continued need for the following skills: Nursing. Plan for next visit: picc line drssing change    Patient and/or caregiver notified and agrees to changes in the Plan of Care: Yes. The following discharge planning was discussed with the pt/caregiver: Patient will be discharged when all goals are met.

## 2023-10-23 ENCOUNTER — APPOINTMENT (OUTPATIENT)
Facility: HOSPITAL | Age: 38
End: 2023-10-23
Payer: COMMERCIAL

## 2023-10-23 ENCOUNTER — HOME CARE VISIT (OUTPATIENT)
Age: 38
End: 2023-10-23
Payer: COMMERCIAL

## 2023-10-23 ENCOUNTER — HOSPITAL ENCOUNTER (OUTPATIENT)
Facility: HOSPITAL | Age: 38
Setting detail: SPECIMEN
Discharge: HOME | End: 2023-10-26
Payer: COMMERCIAL

## 2023-10-23 LAB
ANION GAP SERPL CALC-SCNC: 2 MMOL/L (ref 3–18)
BASOPHILS # BLD: 0 K/UL (ref 0–0.1)
BASOPHILS NFR BLD: 1 % (ref 0–2)
BUN SERPL-MCNC: 18 MG/DL (ref 7–18)
BUN/CREAT SERPL: 24 (ref 12–20)
CALCIUM SERPL-MCNC: 8.6 MG/DL (ref 8.5–10.1)
CHLORIDE SERPL-SCNC: 108 MMOL/L (ref 100–111)
CO2 SERPL-SCNC: 29 MMOL/L (ref 21–32)
CREAT SERPL-MCNC: 0.76 MG/DL (ref 0.6–1.3)
CRP SERPL-MCNC: 0.7 MG/DL (ref 0–0.3)
DIFFERENTIAL METHOD BLD: ABNORMAL
EOSINOPHIL # BLD: 0.1 K/UL (ref 0–0.4)
EOSINOPHIL NFR BLD: 2 % (ref 0–5)
ERYTHROCYTE [DISTWIDTH] IN BLOOD BY AUTOMATED COUNT: 15.6 % (ref 11.6–14.5)
ERYTHROCYTE [SEDIMENTATION RATE] IN BLOOD: 8 MM/HR (ref 0–15)
GLUCOSE SERPL-MCNC: 113 MG/DL (ref 74–99)
HCT VFR BLD AUTO: 41.2 % (ref 36–48)
HGB BLD-MCNC: 13 G/DL (ref 13–16)
IMM GRANULOCYTES # BLD AUTO: 0 K/UL (ref 0–0.04)
IMM GRANULOCYTES NFR BLD AUTO: 0 % (ref 0–0.5)
LYMPHOCYTES # BLD: 1.2 K/UL (ref 0.9–3.6)
LYMPHOCYTES NFR BLD: 32 % (ref 21–52)
MCH RBC QN AUTO: 27.6 PG (ref 24–34)
MCHC RBC AUTO-ENTMCNC: 31.6 G/DL (ref 31–37)
MCV RBC AUTO: 87.5 FL (ref 78–100)
MONOCYTES # BLD: 0.3 K/UL (ref 0.05–1.2)
MONOCYTES NFR BLD: 8 % (ref 3–10)
NEUTS SEG # BLD: 2.1 K/UL (ref 1.8–8)
NEUTS SEG NFR BLD: 57 % (ref 40–73)
NRBC # BLD: 0 K/UL (ref 0–0.01)
NRBC BLD-RTO: 0 PER 100 WBC
PLATELET # BLD AUTO: 132 K/UL (ref 135–420)
PMV BLD AUTO: 11.2 FL (ref 9.2–11.8)
POTASSIUM SERPL-SCNC: 4 MMOL/L (ref 3.5–5.5)
RBC # BLD AUTO: 4.71 M/UL (ref 4.35–5.65)
SODIUM SERPL-SCNC: 139 MMOL/L (ref 136–145)
WBC # BLD AUTO: 3.6 K/UL (ref 4.6–13.2)

## 2023-10-23 PROCEDURE — 80048 BASIC METABOLIC PNL TOTAL CA: CPT

## 2023-10-23 PROCEDURE — G0493 RN CARE EA 15 MIN HH/HOSPICE: HCPCS

## 2023-10-23 PROCEDURE — 36415 COLL VENOUS BLD VENIPUNCTURE: CPT

## 2023-10-23 PROCEDURE — 85652 RBC SED RATE AUTOMATED: CPT

## 2023-10-23 PROCEDURE — 85025 COMPLETE CBC W/AUTO DIFF WBC: CPT

## 2023-10-23 PROCEDURE — 86140 C-REACTIVE PROTEIN: CPT

## 2023-10-24 VITALS
SYSTOLIC BLOOD PRESSURE: 136 MMHG | TEMPERATURE: 97.3 F | RESPIRATION RATE: 14 BRPM | DIASTOLIC BLOOD PRESSURE: 71 MMHG | OXYGEN SATURATION: 98 % | HEART RATE: 81 BPM

## 2023-10-24 ASSESSMENT — ENCOUNTER SYMPTOMS: PAIN LOCATION - PAIN QUALITY: STABBING

## 2023-10-24 NOTE — HOME HEALTH
Skilled reason for visit: wound care, lab draw, and picc line dressing change    Caregiver involvement: spouse involved with ABX administration. Medications reviewed and all medications are available in the home this visit. The following education was provided regarding medications: Take all medications as prescribed. Notify physician before stopping any medications. .    MD notified of any discrepancies/look a-like medications/medication interactions: NA  Medications are effective at this time. Home health supplies by type and quantity ordered/delivered this visit include: NO    Patient education provided this visit:see intervention tab for patient education      Sharps education provided: NA    Patient level of understanding of education provided: Patient/Caregiver response to education: Verbalized understanding       Patient response to procedure performed:  No s/sx of discomfot during dressing change    Agency Progress toward goals: See intervention tab for progressing toward goals      Patient's Progress towards personal goals: See intervention tab for progressing toward goals      Home exercise program: activity as tolerated. Patient is using walker to ambulate    Continued need for the following skills: Nursing. Plan for next visit: picc line change    Patient and/or caregiver notified and agrees to changes in the Plan of Care: Yes. The following discharge planning was discussed with the pt/caregiver: Patient will be discharged when all goals are met.

## 2023-10-30 ENCOUNTER — HOSPITAL ENCOUNTER (OUTPATIENT)
Facility: HOSPITAL | Age: 38
Setting detail: SPECIMEN
Discharge: HOME | End: 2023-11-02
Payer: COMMERCIAL

## 2023-10-30 ENCOUNTER — HOME CARE VISIT (OUTPATIENT)
Age: 38
End: 2023-10-30
Payer: COMMERCIAL

## 2023-10-30 LAB
ANION GAP SERPL CALC-SCNC: 6 MMOL/L (ref 3–18)
BASOPHILS # BLD: 0 K/UL (ref 0–0.1)
BASOPHILS NFR BLD: 1 % (ref 0–2)
BUN SERPL-MCNC: 13 MG/DL (ref 7–18)
BUN/CREAT SERPL: 18 (ref 12–20)
CALCIUM SERPL-MCNC: 8.7 MG/DL (ref 8.5–10.1)
CHLORIDE SERPL-SCNC: 105 MMOL/L (ref 100–111)
CO2 SERPL-SCNC: 29 MMOL/L (ref 21–32)
CREAT SERPL-MCNC: 0.71 MG/DL (ref 0.6–1.3)
CRP SERPL-MCNC: 0.7 MG/DL (ref 0–0.3)
DIFFERENTIAL METHOD BLD: ABNORMAL
EOSINOPHIL # BLD: 0.1 K/UL (ref 0–0.4)
EOSINOPHIL NFR BLD: 2 % (ref 0–5)
ERYTHROCYTE [DISTWIDTH] IN BLOOD BY AUTOMATED COUNT: 15.8 % (ref 11.6–14.5)
ERYTHROCYTE [SEDIMENTATION RATE] IN BLOOD: 6 MM/HR (ref 0–15)
GLUCOSE SERPL-MCNC: 145 MG/DL (ref 74–99)
HCT VFR BLD AUTO: 43.4 % (ref 36–48)
HGB BLD-MCNC: 13.4 G/DL (ref 13–16)
IMM GRANULOCYTES # BLD AUTO: 0 K/UL (ref 0–0.04)
IMM GRANULOCYTES NFR BLD AUTO: 0 % (ref 0–0.5)
LYMPHOCYTES # BLD: 1.1 K/UL (ref 0.9–3.6)
LYMPHOCYTES NFR BLD: 32 % (ref 21–52)
MCH RBC QN AUTO: 27.1 PG (ref 24–34)
MCHC RBC AUTO-ENTMCNC: 30.9 G/DL (ref 31–37)
MCV RBC AUTO: 87.9 FL (ref 78–100)
MONOCYTES # BLD: 0.2 K/UL (ref 0.05–1.2)
MONOCYTES NFR BLD: 7 % (ref 3–10)
NEUTS SEG # BLD: 2.1 K/UL (ref 1.8–8)
NEUTS SEG NFR BLD: 59 % (ref 40–73)
NRBC # BLD: 0 K/UL (ref 0–0.01)
NRBC BLD-RTO: 0 PER 100 WBC
PLATELET # BLD AUTO: 124 K/UL (ref 135–420)
PMV BLD AUTO: 11.7 FL (ref 9.2–11.8)
POTASSIUM SERPL-SCNC: 4.1 MMOL/L (ref 3.5–5.5)
RBC # BLD AUTO: 4.94 M/UL (ref 4.35–5.65)
SODIUM SERPL-SCNC: 140 MMOL/L (ref 136–145)
WBC # BLD AUTO: 3.6 K/UL (ref 4.6–13.2)

## 2023-10-30 PROCEDURE — 86140 C-REACTIVE PROTEIN: CPT

## 2023-10-30 PROCEDURE — G0299 HHS/HOSPICE OF RN EA 15 MIN: HCPCS

## 2023-10-30 PROCEDURE — 80048 BASIC METABOLIC PNL TOTAL CA: CPT

## 2023-10-30 PROCEDURE — 85652 RBC SED RATE AUTOMATED: CPT

## 2023-10-30 PROCEDURE — 36415 COLL VENOUS BLD VENIPUNCTURE: CPT

## 2023-10-30 PROCEDURE — 85025 COMPLETE CBC W/AUTO DIFF WBC: CPT

## 2023-10-31 VITALS
HEART RATE: 94 BPM | OXYGEN SATURATION: 96 % | TEMPERATURE: 97 F | SYSTOLIC BLOOD PRESSURE: 138 MMHG | RESPIRATION RATE: 14 BRPM | DIASTOLIC BLOOD PRESSURE: 82 MMHG

## 2023-10-31 ASSESSMENT — ENCOUNTER SYMPTOMS: PAIN LOCATION - PAIN QUALITY: DULL

## 2023-10-31 NOTE — HOME HEALTH
Skilled reason for visit: picc line drsg change and lab draw    Caregiver involvement: spouse helps with medication management    Medications reviewed and all medications are available in the home this visit. The following education was provided regarding medications: Take all medications as prescribed. Notify physician before stopping any medications. MD notified of any discrepancies/look a-like medications/medication interactions: NA  Medications are effective at this time. Home health supplies by type and quantity ordered/delivered this visit include: no    Patient education provided this visit: see intervention tab for patient education      Sharps education provided: NA    Patient level of understanding of education provided: Patient/Caregiver response to education: Verbalized understanding       Patient response to procedure performed:  PATIENT Destin Gaytan Progress toward goals: See intervention tab for progressing toward goals      Patient's Progress towards personal goals: See intervention tab for progressing toward goals      Home exercise program: ACTIVITY AS TOLERATED      Continued need for the following skills: Nursing. Plan for next visit: picc line drsg change and labs    Patient and/or caregiver notified and agrees to changes in the Plan of Care: Yes. The following discharge planning was discussed with the pt/caregiver: Patient will be discharged when all goals are met.

## 2023-11-06 ENCOUNTER — HOSPITAL ENCOUNTER (OUTPATIENT)
Facility: HOSPITAL | Age: 38
Setting detail: SPECIMEN
Discharge: HOME | End: 2023-11-09
Payer: COMMERCIAL

## 2023-11-06 ENCOUNTER — HOME CARE VISIT (OUTPATIENT)
Age: 38
End: 2023-11-06
Payer: COMMERCIAL

## 2023-11-06 VITALS
HEART RATE: 85 BPM | SYSTOLIC BLOOD PRESSURE: 136 MMHG | TEMPERATURE: 97.4 F | RESPIRATION RATE: 16 BRPM | DIASTOLIC BLOOD PRESSURE: 72 MMHG | OXYGEN SATURATION: 96 %

## 2023-11-06 LAB
ANION GAP SERPL CALC-SCNC: 6 MMOL/L (ref 3–18)
BASOPHILS # BLD: 0 K/UL (ref 0–0.1)
BASOPHILS NFR BLD: 1 % (ref 0–2)
BUN SERPL-MCNC: 15 MG/DL (ref 7–18)
BUN/CREAT SERPL: 21 (ref 12–20)
CALCIUM SERPL-MCNC: 8.6 MG/DL (ref 8.5–10.1)
CHLORIDE SERPL-SCNC: 106 MMOL/L (ref 100–111)
CO2 SERPL-SCNC: 28 MMOL/L (ref 21–32)
CREAT SERPL-MCNC: 0.73 MG/DL (ref 0.6–1.3)
CRP SERPL-MCNC: 0.8 MG/DL (ref 0–0.3)
DIFFERENTIAL METHOD BLD: ABNORMAL
EOSINOPHIL # BLD: 0 K/UL (ref 0–0.4)
EOSINOPHIL NFR BLD: 1 % (ref 0–5)
ERYTHROCYTE [DISTWIDTH] IN BLOOD BY AUTOMATED COUNT: 15.7 % (ref 11.6–14.5)
ERYTHROCYTE [SEDIMENTATION RATE] IN BLOOD: 5 MM/HR (ref 0–15)
GLUCOSE SERPL-MCNC: 100 MG/DL (ref 74–99)
HCT VFR BLD AUTO: 44.1 % (ref 36–48)
HGB BLD-MCNC: 13.8 G/DL (ref 13–16)
IMM GRANULOCYTES # BLD AUTO: 0 K/UL (ref 0–0.04)
IMM GRANULOCYTES NFR BLD AUTO: 1 % (ref 0–0.5)
LYMPHOCYTES # BLD: 1 K/UL (ref 0.9–3.6)
LYMPHOCYTES NFR BLD: 29 % (ref 21–52)
MCH RBC QN AUTO: 27.4 PG (ref 24–34)
MCHC RBC AUTO-ENTMCNC: 31.3 G/DL (ref 31–37)
MCV RBC AUTO: 87.7 FL (ref 78–100)
MONOCYTES # BLD: 0.4 K/UL (ref 0.05–1.2)
MONOCYTES NFR BLD: 11 % (ref 3–10)
NEUTS SEG # BLD: 2 K/UL (ref 1.8–8)
NEUTS SEG NFR BLD: 58 % (ref 40–73)
NRBC # BLD: 0 K/UL (ref 0–0.01)
NRBC BLD-RTO: 0 PER 100 WBC
PLATELET # BLD AUTO: 139 K/UL (ref 135–420)
PMV BLD AUTO: 11.3 FL (ref 9.2–11.8)
POTASSIUM SERPL-SCNC: 3.7 MMOL/L (ref 3.5–5.5)
RBC # BLD AUTO: 5.03 M/UL (ref 4.35–5.65)
SODIUM SERPL-SCNC: 140 MMOL/L (ref 136–145)
WBC # BLD AUTO: 3.4 K/UL (ref 4.6–13.2)

## 2023-11-06 PROCEDURE — 86140 C-REACTIVE PROTEIN: CPT

## 2023-11-06 PROCEDURE — 85652 RBC SED RATE AUTOMATED: CPT

## 2023-11-06 PROCEDURE — 80048 BASIC METABOLIC PNL TOTAL CA: CPT

## 2023-11-06 PROCEDURE — G0299 HHS/HOSPICE OF RN EA 15 MIN: HCPCS

## 2023-11-06 PROCEDURE — 85025 COMPLETE CBC W/AUTO DIFF WBC: CPT

## 2023-11-06 NOTE — HOME HEALTH
Skilled reason for visit: [address] [facility]    Caregiver involvement: Rosa Bermudez MD APPTS. Medications reviewed and all medications are available in the home this visit. The following education was provided regarding medications: Take all medications as prescribed. Notify physician before stopping any medications. MD notified of any discrepancies/look a-like medications/medication interactions: NA  Medications are effective at this time. Home health supplies by type and quantity ordered/delivered this visit include: NO    Patient education provided this visit: see intervention tab for patient education    Sharps education provided: NA    Patient level of understanding of education provided: Patient/Caregiver response to education: Verbalized understanding       Patient response to procedure performed:  [address] Progress toward goals: See intervention tab for progressing toward goals      Patient's Progress towards personal goals: See intervention tab for progressing toward goals      Home exercise program: ACTIVITY AS TOLERATED      Continued need for the following skills: Nursing. Plan for next visit: [facility]    Patient and/or caregiver notified and agrees to changes in the Plan of Care: Yes. The following discharge planning was discussed with the pt/caregiver: Patient will be discharged when all goals are met.

## 2023-11-13 ENCOUNTER — HOME CARE VISIT (OUTPATIENT)
Age: 38
End: 2023-11-13
Payer: COMMERCIAL

## 2023-11-13 ENCOUNTER — HOSPITAL ENCOUNTER (OUTPATIENT)
Facility: HOSPITAL | Age: 38
Setting detail: SPECIMEN
Discharge: HOME | End: 2023-11-16
Payer: COMMERCIAL

## 2023-11-13 VITALS
TEMPERATURE: 96.8 F | DIASTOLIC BLOOD PRESSURE: 82 MMHG | SYSTOLIC BLOOD PRESSURE: 136 MMHG | OXYGEN SATURATION: 98 % | HEART RATE: 95 BPM

## 2023-11-13 LAB
ANION GAP SERPL CALC-SCNC: 2 MMOL/L (ref 3–18)
BASOPHILS # BLD: 0 K/UL (ref 0–0.1)
BASOPHILS NFR BLD: 1 % (ref 0–2)
BUN SERPL-MCNC: 19 MG/DL (ref 7–18)
BUN/CREAT SERPL: 26 (ref 12–20)
CALCIUM SERPL-MCNC: 8.7 MG/DL (ref 8.5–10.1)
CHLORIDE SERPL-SCNC: 107 MMOL/L (ref 100–111)
CO2 SERPL-SCNC: 30 MMOL/L (ref 21–32)
CREAT SERPL-MCNC: 0.74 MG/DL (ref 0.6–1.3)
CRP SERPL-MCNC: 0.6 MG/DL (ref 0–0.3)
DIFFERENTIAL METHOD BLD: ABNORMAL
EOSINOPHIL # BLD: 0.1 K/UL (ref 0–0.4)
EOSINOPHIL NFR BLD: 2 % (ref 0–5)
ERYTHROCYTE [DISTWIDTH] IN BLOOD BY AUTOMATED COUNT: 15.5 % (ref 11.6–14.5)
ERYTHROCYTE [SEDIMENTATION RATE] IN BLOOD: 2 MM/HR (ref 0–15)
GLUCOSE SERPL-MCNC: 121 MG/DL (ref 74–99)
HCT VFR BLD AUTO: 44 % (ref 36–48)
HGB BLD-MCNC: 13.7 G/DL (ref 13–16)
IMM GRANULOCYTES # BLD AUTO: 0 K/UL (ref 0–0.04)
IMM GRANULOCYTES NFR BLD AUTO: 1 % (ref 0–0.5)
LYMPHOCYTES # BLD: 1.2 K/UL (ref 0.9–3.6)
LYMPHOCYTES NFR BLD: 31 % (ref 21–52)
MCH RBC QN AUTO: 27.3 PG (ref 24–34)
MCHC RBC AUTO-ENTMCNC: 31.1 G/DL (ref 31–37)
MCV RBC AUTO: 87.8 FL (ref 78–100)
MONOCYTES # BLD: 0.4 K/UL (ref 0.05–1.2)
MONOCYTES NFR BLD: 9 % (ref 3–10)
NEUTS SEG # BLD: 2.3 K/UL (ref 1.8–8)
NEUTS SEG NFR BLD: 58 % (ref 40–73)
NRBC # BLD: 0 K/UL (ref 0–0.01)
NRBC BLD-RTO: 0 PER 100 WBC
PLATELET # BLD AUTO: 160 K/UL (ref 135–420)
PMV BLD AUTO: 11.7 FL (ref 9.2–11.8)
POTASSIUM SERPL-SCNC: 5.3 MMOL/L (ref 3.5–5.5)
RBC # BLD AUTO: 5.01 M/UL (ref 4.35–5.65)
SODIUM SERPL-SCNC: 139 MMOL/L (ref 136–145)
WBC # BLD AUTO: 4.1 K/UL (ref 4.6–13.2)

## 2023-11-13 PROCEDURE — G0299 HHS/HOSPICE OF RN EA 15 MIN: HCPCS

## 2023-11-13 PROCEDURE — 85652 RBC SED RATE AUTOMATED: CPT

## 2023-11-13 PROCEDURE — 86140 C-REACTIVE PROTEIN: CPT

## 2023-11-13 PROCEDURE — 85025 COMPLETE CBC W/AUTO DIFF WBC: CPT

## 2023-11-13 PROCEDURE — 80048 BASIC METABOLIC PNL TOTAL CA: CPT

## 2023-11-14 NOTE — HOME HEALTH
Skilled reason for visit: wound care    Caregiver involvement: spouse helps with medications and wound care. Medications reviewed and all medications are available in the home this visit. The following education was provided regarding medications: Take all medications as prescribed. Notify physician before stopping any medications. MD notified of any discrepancies/look a-like medications/medication interactions: NA  Medications are effective at this time. Home health supplies by type and quantity ordered/delivered this visit include: NA    Patient education provided this visit: see intervention tab for patient education    Sharps education provided: NA    Patient level of understanding of education provided: Patient/Caregiver response to education: Verbalized understanding       Patient response to procedure performed:  patient tolerated procedure well    Agency Progress toward goals: See intervention tab for progressing toward goals      Patient's Progress towards personal goals: See intervention tab for progressing toward goals      Home exercise program: ACTIVITY AS TOLERATED      Continued need for the following skills: Nursing. Plan for next visit: pull PICC line    Patient and/or caregiver notified and agrees to changes in the Plan of Care: Yes. The following discharge planning was discussed with the pt/caregiver: Patient will be discharged when all goals are met.

## 2023-11-17 ENCOUNTER — HOME CARE VISIT (OUTPATIENT)
Age: 38
End: 2023-11-17
Payer: COMMERCIAL

## 2023-11-17 PROCEDURE — G0162 HHC RN E&M PLAN SVS, 15 MIN: HCPCS

## 2023-11-19 VITALS
TEMPERATURE: 97.7 F | RESPIRATION RATE: 16 BRPM | OXYGEN SATURATION: 98 % | SYSTOLIC BLOOD PRESSURE: 132 MMHG | DIASTOLIC BLOOD PRESSURE: 76 MMHG | HEART RATE: 79 BPM

## 2023-11-20 ENCOUNTER — HOSPITAL ENCOUNTER (OUTPATIENT)
Facility: HOSPITAL | Age: 38
Setting detail: RECURRING SERIES
Discharge: HOME | End: 2023-11-23
Payer: COMMERCIAL

## 2023-11-20 PROCEDURE — 97535 SELF CARE MNGMENT TRAINING: CPT

## 2023-11-20 PROCEDURE — 97162 PT EVAL MOD COMPLEX 30 MIN: CPT

## 2023-11-20 NOTE — PROGRESS NOTES
rajendra    Justification for Eval Code Complexity:  Patient History : high  Examination see exam   Clinical Presentation: moderate  Clinical Decision Making : FOTO : 70 /100      ASSESSMENT/Changes in Function:     Patient will continue to benefit from skilled PT services to analyze and address functional mobility deficits, analyze and address ROM deficits, analyze and address strength deficits, analyze and address soft tissue restrictions, analyze and cue for proper movement patterns, analyze and modify for postural abnormalities, analyze and address imbalance/dizziness, and instruct in home and community integration to address functional deficits and attain remaining goals.     [x]  See Plan of Care - for goals and assessment     PLAN  []  Upgrade activities as tolerated     [x]  Continue plan of care  []  Update interventions per flow sheet       []  Other:_      Edna Francois PT 11/20/2023  3:09 PM

## 2023-11-20 NOTE — PROGRESS NOTES
necessary. Physician's Signature:_________________________   DATE:_________   TIME:________                           Felix Whyte MD    ** Signature, Date and Time must be completed for valid certification **  [facility] or you may fax the signed copy to [phone]. Thank you.

## 2023-11-28 ENCOUNTER — HOSPITAL ENCOUNTER (OUTPATIENT)
Facility: HOSPITAL | Age: 38
Setting detail: RECURRING SERIES
Discharge: HOME | End: 2023-12-01
Payer: COMMERCIAL

## 2023-11-28 PROCEDURE — 97530 THERAPEUTIC ACTIVITIES: CPT

## 2023-11-28 PROCEDURE — 97110 THERAPEUTIC EXERCISES: CPT

## 2023-11-28 NOTE — PROGRESS NOTES
PHYSICAL / OCCUPATIONAL THERAPY - DAILY TREATMENT NOTE (updated )    Patient Name: Alis Munoz    Date: 2023    : 1985  Insurance: Payor: Yusef Lighter / Plan: Yusef Lighter / Product Type: *No Product type* /      Patient  verified Yes     Visit #   Current / Total 2 16   Time   In / Out 4:40 pm 5:30 pm   Pain   In / Out 2/10 2/10   Subjective Functional Status/Changes: \"I'm having pain in my low back and lower abdomen. It's been like that since surgery. \"     TREATMENT AREA =  Other low back pain [M54.59]    OBJECTIVE    Modalities Rationale:     decrease inflammation and decrease pain to improve patient's ability to progress to PLOF and address remaining functional goals. min [] Estim Unattended, type/location:                                      []  w/ice    []  w/heat    min [] Estim Attended, type/location:                                     []  w/US     []  w/ice    []  w/heat    []  TENS insruct      min []  Mechanical Traction: type/lbs                   []  pro   []  sup   []  int   []  cont    []  before manual    []  after manual    min []  Ultrasound, settings/location:     10 min  unbill [x]  Ice     []  Heat    location/position: Seated to low back    min []  Paraffin,  details:     min []  Vasopneumatic Device, press/temp:     min []  Rodell Paget / Zahira Serge: If using vaso (only need to measure limb vaso being performed on)      pre-treatment girth :       post-treatment girth :       measured at (landmark location) :      min []  Other:    Skin assessment post-treatment:   Intact      Therapeutic Procedures: Tx Min Billable or 1:1 Min (if diff from Tx Min) Procedure, Rationale, Specifics   8  [id number] Therapeutic Exercise (timed):  increase ROM, strength, coordination, balance, and proprioception to improve patient's ability to progress to PLOF and address remaining functional goals.  (see flow sheet as applicable)     Details if applicable:       32  01244 Therapeutic Activity (timed):

## 2023-11-30 ENCOUNTER — HOSPITAL ENCOUNTER (OUTPATIENT)
Facility: HOSPITAL | Age: 38
Setting detail: RECURRING SERIES
End: 2023-11-30
Payer: COMMERCIAL

## 2023-11-30 PROCEDURE — 97530 THERAPEUTIC ACTIVITIES: CPT

## 2023-11-30 PROCEDURE — 97110 THERAPEUTIC EXERCISES: CPT

## 2023-11-30 NOTE — PROGRESS NOTES
PHYSICAL / OCCUPATIONAL THERAPY - DAILY TREATMENT NOTE (updated )    Patient Name: Siddharth Teresa    Date: 2023    : 1985  Insurance: Payor: Jennifer Johnson / Plan: Jennifer Johnson / Product Type: *No Product type* /      Patient  verified Yes     Visit #   Current / Total 3 16   Time   In / Out 4:40 5:30   Pain   In / Out 0 1 - legs   Subjective Functional Status/Changes: Pt reports some increased soreness following last session, but states it just felt like he worked out, it wasn't \"pain pain\"     TREATMENT AREA =  Other low back pain [M54.59]    OBJECTIVE    Modalities Rationale:     decrease pain to improve patient's ability to progress to PLOF and address remaining functional goals. 10 min  unbill [x]  Ice     []  Heat    location/position: Low back/sitting   Skin assessment post-treatment:   Intact      Therapeutic Procedures: Tx Min Billable or 1:1 Min (if diff from Tx Min) Procedure, Rationale, Specifics   13  [id number] Therapeutic Exercise (timed):  increase ROM, strength, coordination, balance, and proprioception to improve patient's ability to progress to PLOF and address remaining functional goals. (see flow sheet as applicable)     Details if applicable:       27  22008 Therapeutic Activity (timed):  use of dynamic activities replicating functional movements to increase ROM, strength, coordination, balance, and proprioception in order to improve patient's ability to progress to PLOF and address remaining functional goals.   (see flow sheet as applicable)     Details if applicable:     36  MC BC Totals Reminder: bill using total billable min of TIMED therapeutic procedures (example: do not include dry needle or estim unattended, both untimed codes, in totals to left)  8-22 min = 1 unit; 23-37 min = 2 units; 38-52 min = 3 units; 53-67 min = 4 units; 68-82 min = 5 units   Total Total     [x]  Patient Education billed concurrently with other procedures   [x] Review HEP    [] Progressed/Changed HEP, 4+ 4+   Knee   Extension 5 5              Flexion 4 4-      3- Goal: Pt to improve bilateral SLS to > 45 without deviations to improve participation in recreational activities. Status at last note/certification: right = 20 seconds with moderate deviations and LE support on stance limb, left = 24 seconds with LE support on stance limb  4- Goal: Pt to report < 3/10 pain at worst to increase ease with ADLs. Status at last note/certification: 21/19 pain at worst  Current: Progressing - 5/10 worst reported pain in the last week (11/30/23)  5- Goal: Pt to report FOTO score of 74 to show improved function and quality of life.   Status at last note/certification: FOTO 70 pts        Next PN/ RC due 12/19/23  Auth due E-MNR at 5th visit, 16 visits total remaining    [facility] activities as tolerated    Jeffery Stratton, PT    11/30/2023    5:22 PM    Future Appointments   Date Time Provider [address]   12/5/2023  4:40 PM Jeffery Stratton PT [facility]   12/7/2023  4:40 PM Aly NOBLES [facility]

## 2023-12-05 ENCOUNTER — APPOINTMENT (OUTPATIENT)
Facility: HOSPITAL | Age: 38
End: 2023-12-05
Payer: COMMERCIAL

## 2023-12-07 ENCOUNTER — HOSPITAL ENCOUNTER (OUTPATIENT)
Facility: HOSPITAL | Age: 38
Setting detail: RECURRING SERIES
Discharge: HOME | End: 2023-12-10
Payer: COMMERCIAL

## 2023-12-07 PROCEDURE — 97110 THERAPEUTIC EXERCISES: CPT

## 2023-12-07 PROCEDURE — 97530 THERAPEUTIC ACTIVITIES: CPT

## 2023-12-07 NOTE — PROGRESS NOTES
PHYSICAL / OCCUPATIONAL THERAPY - DAILY TREATMENT NOTE (updated )    Patient Name: Riri Flow    Date: 2023    : 1985  Insurance: Payor: Sean Boston / Plan: STEPHANIE VILLAN / Product Type: *No Product type* /      Patient  verified Yes     Visit #   Current / Total 4 16   Time   In / Out 4:40 5:45   Pain   In / Out 3-4 1   Subjective Functional Status/Changes: My biggest concerns were 1) pain with supine, 2) fear that if I fall I can't get up. Now I feel much stronger. More confident     TREATMENT AREA =  Other low back pain [M54.59]    OBJECTIVE    Modalities Rationale:     decrease pain to improve patient's ability to progress to PLOF and address remaining functional goals. min [] Estim Unattended, type/location:                                      []  w/ice    []  w/heat    min [] Estim Attended, type/location:                                     []  w/US     []  w/ice    []  w/heat    []  TENS insruct      min []  Mechanical Traction: type/lbs                   []  pro   []  sup   []  int   []  cont    []  before manual    []  after manual    min []  Ultrasound, settings/location:     10 min  unbill [x]  Ice     []  Heat    location/position: LB, sitting    min []  Paraffin,  details:     min []  Vasopneumatic Device, press/temp:     min []  Caroline Baker / Pilo Hay: If using vaso (only need to measure limb vaso being performed on)      pre-treatment girth :       post-treatment girth :       measured at (landmark location) :      min []  Other:    Skin assessment post-treatment:   Intact      Therapeutic Procedures: Tx Min Billable or 1:1 Min (if diff from Tx Min) Procedure, Rationale, Specifics   [id number] Therapeutic Activity (timed):  use of dynamic activities replicating functional movements to increase ROM, strength, coordination, balance, and proprioception in order to improve patient's ability to progress to PLOF and address remaining functional goals.   (see flow sheet as applicable)

## 2023-12-13 ENCOUNTER — HOSPITAL ENCOUNTER (OUTPATIENT)
Facility: HOSPITAL | Age: 38
Setting detail: RECURRING SERIES
Discharge: HOME | End: 2023-12-16
Payer: COMMERCIAL

## 2023-12-13 PROCEDURE — 97110 THERAPEUTIC EXERCISES: CPT

## 2023-12-13 PROCEDURE — 97530 THERAPEUTIC ACTIVITIES: CPT

## 2023-12-13 NOTE — PROGRESS NOTES
PHYSICAL / OCCUPATIONAL THERAPY - DAILY TREATMENT NOTE (updated )    Patient Name: Riri Flow    Date: 2023    : 1985  Insurance: Payor: Sean Boston / Plan: Sean Boston / Product Type: *No Product type* /      Patient  verified Yes     Visit #   Current / Total 5 16   Time   In / Out 4:40 5:30   Pain   In / Out 2.5-3 1.5   Subjective Functional Status/Changes: My back is irritated today     TREATMENT AREA =  Other low back pain [M54.59]    OBJECTIVE    Modalities Rationale:     decrease pain to improve patient's ability to progress to PLOF and address remaining functional goals. min [] Estim Unattended, type/location:                                      []  w/ice    []  w/heat    min [] Estim Attended, type/location:                                     []  w/US     []  w/ice    []  w/heat    []  TENS insruct      min []  Mechanical Traction: type/lbs                   []  pro   []  sup   []  int   []  cont    []  before manual    []  after manual    min []  Ultrasound, settings/location:     10 min  unbill [x]  Ice     []  Heat    location/position: Sitting,LB    min []  Paraffin,  details:     min []  Vasopneumatic Device, press/temp:     min []  Caroline Baker / Pilo Hay: If using vaso (only need to measure limb vaso being performed on)      pre-treatment girth :       post-treatment girth :       measured at (landmark location) :      min []  Other:    Skin assessment post-treatment:   Intact      Therapeutic Procedures: Tx Min Billable or 1:1 Min (if diff from Tx Min) Procedure, Rationale, Specifics   25  [id number] Therapeutic Activity (timed):  use of dynamic activities replicating functional movements to increase ROM, strength, coordination, balance, and proprioception in order to improve patient's ability to progress to PLOF and address remaining functional goals.   (see flow sheet as applicable)     Details if applicable:       15  [id number] Therapeutic Exercise (timed):  increase ROM, strength,

## 2023-12-20 ENCOUNTER — APPOINTMENT (OUTPATIENT)
Facility: HOSPITAL | Age: 38
End: 2023-12-20
Payer: COMMERCIAL